# Patient Record
Sex: FEMALE | Race: WHITE
[De-identification: names, ages, dates, MRNs, and addresses within clinical notes are randomized per-mention and may not be internally consistent; named-entity substitution may affect disease eponyms.]

---

## 2020-03-28 ENCOUNTER — HOSPITAL ENCOUNTER (INPATIENT)
Dept: HOSPITAL 56 - MW.ED | Age: 35
LOS: 2 days | Discharge: HOME | DRG: 776 | End: 2020-03-30
Attending: OBSTETRICS & GYNECOLOGY | Admitting: OBSTETRICS & GYNECOLOGY
Payer: MEDICAID

## 2020-03-28 LAB
BUN SERPL-MCNC: 11 MG/DL (ref 7–18)
BUN SERPL-MCNC: 12 MG/DL (ref 7–18)
CHLORIDE SERPL-SCNC: 105 MMOL/L (ref 98–107)
CHLORIDE SERPL-SCNC: 106 MMOL/L (ref 98–107)
CO2 SERPL-SCNC: 26.7 MMOL/L (ref 21–32)
CO2 SERPL-SCNC: 28.3 MMOL/L (ref 21–32)
GLUCOSE SERPL-MCNC: 82 MG/DL (ref 74–106)
GLUCOSE SERPL-MCNC: 90 MG/DL (ref 74–106)
POTASSIUM SERPL-SCNC: 3.6 MMOL/L (ref 3.5–5.1)
POTASSIUM SERPL-SCNC: 3.7 MMOL/L (ref 3.5–5.1)
SODIUM SERPL-SCNC: 143 MMOL/L (ref 136–145)
SODIUM SERPL-SCNC: 144 MMOL/L (ref 136–145)

## 2020-03-28 PROCEDURE — P9016 RBC LEUKOCYTES REDUCED: HCPCS

## 2020-03-28 RX ADMIN — OXYCODONE HYDROCHLORIDE AND ACETAMINOPHEN PRN TAB: 5; 325 TABLET ORAL at 22:37

## 2020-03-28 RX ADMIN — MAGNESIUM SULFATE IN WATER SCH MLS/HR: 40 INJECTION, SOLUTION INTRAVENOUS at 21:50

## 2020-03-28 NOTE — EDM.PDOC
ED HPI GENERAL MEDICAL PROBLEM





- General


Chief Complaint: OB/GYN Problem


Stated Complaint: UNKNOWN


Time Seen by Provider: 03/28/20 18:13


Source of Information: Reports: Patient


History Limitations: Reports: No Limitations





- History of Present Illness


INITIAL COMMENTS - FREE TEXT/NARRATIVE: 





34-year-old female delivered 5 days ago has a history of eclampsia on 

medication.  Patient is on 20 mg of labetalol daily and presented with 

headaches and right upper quadrant abdominal pain.  Denies seizures or any 

other problems.


Onset: Today


Duration: Hour(s):, Getting Worse


Location: Reports: Head, Abdomen


Quality: Reports: Burning, Pressure


Severity: Moderate


Improves with: Reports: None


Worsens with: Reports: None


Associated Symptoms: Reports: Headaches


  ** post sx site


Pain Score (Numeric/FACES): 10





- Related Data


 Allergies











Allergy/AdvReac Type Severity Reaction Status Date / Time


 


ciprofloxacin [From Cipro] Allergy  Hives Verified 03/28/20 17:53


 


Sulfa (Sulfonamide Allergy  Swelling Verified 03/28/20 17:53





Antibiotics)     


 


tizanidine Allergy  Rash Verified 03/28/20 17:53











Home Meds: 


 Home Meds





Acetaminophen/oxyCODONE [Percocet 325-5 MG] 5 - 325 mg PO Q4HR PRN 03/28/20 [

History]


Labetalol [Normodyne] 200 mg PO DAILY 03/28/20 [History]











Past Medical History


HEENT History: Reports: None


Cardiovascular History: Reports: Hypertension


Respiratory History: Reports: Asthma


Genitourinary History: Reports: None


OB/GYN History: Reports: Pregnancy


Musculoskeletal History: Reports: RA


Psychiatric History: Reports: Bipolar, Depression, PTSD, Suicidal Ideation





- Infectious Disease History


Infectious Disease History: Reports: Chicken Pox, Hepatitis A, Hepatitis B, 

Hepatitis C, HIV-Human Immunodeficiency Virus, Measles, Mumps, Shingles





- Past Surgical History


Female  Surgical History: Reports: None





Social & Family History





- Family History


Family Medical History: Noncontributory





ED ROS GENERAL





- Review of Systems


Review Of Systems: See Below


Constitutional: Reports: Weakness, Fatigue


Respiratory: Reports: No Symptoms


Cardiovascular: Reports: No Symptoms


Endocrine: Reports: No Symptoms


GI/Abdominal: Reports: No Symptoms


: Reports: No Symptoms


Neurological: Reports: Headache


Psychiatric: Reports: No Symptoms


Hematologic/Lymphatic: Reports: No Symptoms


Immunologic: Reports: No Symptoms





ED EXAM PREGNANCY





- Physical Exam


Exam: See Below


Exam Limited By: No Limitations


General Appearance: Alert, WD/WN, No Apparent Distress, Moderate Distress


Eye Exam: Bilateral Eye: Normal Fundi, Normal Inspection


Ears: Normal External Exam, Normal Canal


Nose: Normal Inspection, Normal Mucosa


Throat/Mouth: Normal Inspection, Normal Lips


Head: Atraumatic, Normocephalic


Neck: Normal Inspection, Supple


Respiratory/Chest: No Respiratory Distress, Lungs Clear


Cardiovascular: Normal Peripheral Pulses, Regular Rate, Rhythm, No JVD


GI/Abdominal Exam: Normal Bowel Sounds, Soft, Non-Tender


Back Exam: Normal Inspection, Full Range of Motion


Extremities: Normal Inspection, Normal Range of Motion


Neurological: Alert, Oriented, CN II-XII Intact, Normal Cognition, Normal 

Reflexes, No Motor/Sensory Deficits


Psychiatric: Normal Affect, Normal Mood


Skin Exam: Warm, Dry, Normal Color





Course





- Vital Signs


Text/Narrative:: 





This 34-year-old female presents the emergency room chief complaint of 

postpartum eclampsia.  Blood pressure was 180/135 when she presented patient 

given hydralazine IV her blood pressure responded to 130/80.  Patient had a CT 

scan of the head which was negative.  Patient is CBC and electrolytes as well 

as urine.  They appear normal except for anemia hemoglobin is 7.7.  I discussed 

the case with .  Just the patient go to labor and delivery.  Blood 

pressure is now 175 systolic.  Patient is oriented x3.  Patient complaining of 

a mild headache.





AssessmEnt: Postpartum hypertension/eclampsia








The case with the GYN he wishes patient sent to L&D


Last Recorded V/S: 


 Last Vital Signs











Temp  97.3 F   03/28/20 17:36


 


Pulse  103 H  03/28/20 18:33


 


Resp  21 H  03/28/20 18:33


 


BP  174/110 H  03/28/20 18:33


 


Pulse Ox  96   03/28/20 18:33














- Orders/Labs/Meds


Orders: 


 Active Orders 24 hr











 Category Date Time Status


 


 EKG Documentation Completion [RC] STAT Care  03/28/20 17:47 Active


 


 Labetalol [Normodyne] Med  03/28/20 19:34 Once





 20 mg IVPUSH ONETIME ONE   


 


 Sodium Chloride 0.9% [Saline Flush] Med  03/28/20 17:47 Active





 10 ml FLUSH ASDIRECTED PRN   


 


 Sodium Chloride 0.9% [Saline Flush] Med  03/28/20 17:47 Active





 2.5 ml FLUSH ASDIRECTED PRN   


 


 Saline Lock Insert [OM.PC] Stat Oth  03/28/20 17:47 Ordered








 Medication Orders





Labetalol HCl (Normodyne)  20 mg IVPUSH ONETIME ONE; Protocol


   Stop: 03/28/20 19:35


Sodium Chloride (Saline Flush)  10 ml FLUSH ASDIRECTED PRN


   PRN Reason: Keep Vein Open


Sodium Chloride (Saline Flush)  2.5 ml FLUSH ASDIRECTED PRN


   PRN Reason: Keep Vein Open








Labs: 


 Laboratory Tests











  03/28/20 03/28/20 03/28/20 Range/Units





  17:43 17:43 17:43 


 


WBC  10.24    (4.0-11.0)  K/uL


 


RBC  2.66 L    (4.30-5.90)  M/uL


 


Hgb  7.4 L    (12.0-16.0)  g/dL


 


Hct  23.1 L    (36.0-46.0)  %


 


MCV  86.8    (80.0-98.0)  fL


 


MCH  27.8    (27.0-32.0)  pg


 


MCHC  32.0    (31.0-37.0)  g/dL


 


RDW Std Deviation  46.3    (28.0-62.0)  fl


 


RDW Coeff of Pietro  15    (11.0-15.0)  %


 


Plt Count  318    (150-400)  K/uL


 


MPV  9.00    (7.40-12.00)  fL


 


Neut % (Auto)  69.5    (48.0-80.0)  %


 


Lymph % (Auto)  21.2    (16.0-40.0)  %


 


Mono % (Auto)  7.3    (0.0-15.0)  %


 


Eos % (Auto)  1.9    (0.0-7.0)  %


 


Baso % (Auto)  0.1    (0.0-1.5)  %


 


Neut # (Auto)  7.1 H    (1.4-5.7)  K/uL


 


Lymph # (Auto)  2.2    (0.6-2.4)  K/uL


 


Mono # (Auto)  0.8    (0.0-0.8)  K/uL


 


Eos # (Auto)  0.2    (0.0-0.7)  K/uL


 


Baso # (Auto)  0.0    (0.0-0.1)  K/uL


 


Nucleated RBC %  0.0    /100WBC


 


Nucleated RBCs #  0    K/uL


 


INR    0.90  


 


Sodium   143   (136-145)  mmol/L


 


Potassium   3.7   (3.5-5.1)  mmol/L


 


Chloride   105   ()  mmol/L


 


Carbon Dioxide   26.7   (21.0-32.0)  mmol/L


 


BUN   12   (7.0-18.0)  mg/dL


 


Creatinine   0.7   (0.6-1.0)  mg/dL


 


Est Cr Clr Drug Dosing   108.04   mL/min


 


Estimated GFR (MDRD)   > 60.0   ml/min


 


Glucose   90   ()  mg/dL


 


Calcium   8.9   (8.5-10.1)  mg/dL


 


Total Bilirubin   0.6   (0.2-1.0)  mg/dL


 


AST   31   (15-37)  IU/L


 


ALT   21   (14-63)  IU/L


 


Alkaline Phosphatase   85   ()  U/L


 


Total Protein   6.0 L   (6.4-8.2)  g/dL


 


Albumin   2.4 L   (3.4-5.0)  g/dL


 


Globulin   3.6   (2.6-4.0)  g/dL


 


Albumin/Globulin Ratio   0.7 L   (0.9-1.6)  


 


Urine Color     


 


Urine Appearance     


 


Urine pH     (5.0-8.0)  


 


Ur Specific Gravity     (1.001-1.035)  


 


Urine Protein     (NEGATIVE)  mg/dL


 


Urine Glucose (UA)     (NEGATIVE)  mg/dL


 


Urine Ketones     (NEGATIVE)  mg/dL


 


Urine Occult Blood     (NEGATIVE)  


 


Urine Nitrite     (NEGATIVE)  


 


Urine Bilirubin     (NEGATIVE)  


 


Urine Urobilinogen     (<2.0)  EU/dL


 


Ur Leukocyte Esterase     (NEGATIVE)  


 


Urine RBC     (0-2/HPF)  


 


Urine WBC     (0-5/HPF)  


 


Ur Epithelial Cells     (NONE-FEW)  


 


Urine Bacteria     (NEGATIVE)  














  03/28/20 Range/Units





  17:47 


 


WBC   (4.0-11.0)  K/uL


 


RBC   (4.30-5.90)  M/uL


 


Hgb   (12.0-16.0)  g/dL


 


Hct   (36.0-46.0)  %


 


MCV   (80.0-98.0)  fL


 


MCH   (27.0-32.0)  pg


 


MCHC   (31.0-37.0)  g/dL


 


RDW Std Deviation   (28.0-62.0)  fl


 


RDW Coeff of Pietro   (11.0-15.0)  %


 


Plt Count   (150-400)  K/uL


 


MPV   (7.40-12.00)  fL


 


Neut % (Auto)   (48.0-80.0)  %


 


Lymph % (Auto)   (16.0-40.0)  %


 


Mono % (Auto)   (0.0-15.0)  %


 


Eos % (Auto)   (0.0-7.0)  %


 


Baso % (Auto)   (0.0-1.5)  %


 


Neut # (Auto)   (1.4-5.7)  K/uL


 


Lymph # (Auto)   (0.6-2.4)  K/uL


 


Mono # (Auto)   (0.0-0.8)  K/uL


 


Eos # (Auto)   (0.0-0.7)  K/uL


 


Baso # (Auto)   (0.0-0.1)  K/uL


 


Nucleated RBC %   /100WBC


 


Nucleated RBCs #   K/uL


 


INR   


 


Sodium   (136-145)  mmol/L


 


Potassium   (3.5-5.1)  mmol/L


 


Chloride   ()  mmol/L


 


Carbon Dioxide   (21.0-32.0)  mmol/L


 


BUN   (7.0-18.0)  mg/dL


 


Creatinine   (0.6-1.0)  mg/dL


 


Est Cr Clr Drug Dosing   mL/min


 


Estimated GFR (MDRD)   ml/min


 


Glucose   ()  mg/dL


 


Calcium   (8.5-10.1)  mg/dL


 


Total Bilirubin   (0.2-1.0)  mg/dL


 


AST   (15-37)  IU/L


 


ALT   (14-63)  IU/L


 


Alkaline Phosphatase   ()  U/L


 


Total Protein   (6.4-8.2)  g/dL


 


Albumin   (3.4-5.0)  g/dL


 


Globulin   (2.6-4.0)  g/dL


 


Albumin/Globulin Ratio   (0.9-1.6)  


 


Urine Color  YELLOW  


 


Urine Appearance  CLEAR  


 


Urine pH  7.0  (5.0-8.0)  


 


Ur Specific Gravity  1.015  (1.001-1.035)  


 


Urine Protein  NEGATIVE  (NEGATIVE)  mg/dL


 


Urine Glucose (UA)  NEGATIVE  (NEGATIVE)  mg/dL


 


Urine Ketones  NEGATIVE  (NEGATIVE)  mg/dL


 


Urine Occult Blood  SMALL H  (NEGATIVE)  


 


Urine Nitrite  NEGATIVE  (NEGATIVE)  


 


Urine Bilirubin  NEGATIVE  (NEGATIVE)  


 


Urine Urobilinogen  0.2  (<2.0)  EU/dL


 


Ur Leukocyte Esterase  NEGATIVE  (NEGATIVE)  


 


Urine RBC  1-3  (0-2/HPF)  


 


Urine WBC  0-1  (0-5/HPF)  


 


Ur Epithelial Cells  MODERATE  (NONE-FEW)  


 


Urine Bacteria  RARE  (NEGATIVE)  











Meds: 


Medications











Generic Name Dose Route Start Last Admin





  Trade Name Freq  PRN Reason Stop Dose Admin


 


Labetalol HCl  20 mg  03/28/20 19:34  





  Normodyne  IVPUSH  03/28/20 19:35  





  ONETIME ONE   





     





     





  Protocol   





     


 


Sodium Chloride  10 ml  03/28/20 17:47  





  Saline Flush  FLUSH   





  ASDIRECTED PRN   





  Keep Vein Open   





     





     





     


 


Sodium Chloride  2.5 ml  03/28/20 17:47  





  Saline Flush  FLUSH   





  ASDIRECTED PRN   





  Keep Vein Open   





     





     





     














Discontinued Medications














Generic Name Dose Route Start Last Admin





  Trade Name Freq  PRN Reason Stop Dose Admin


 


Hydralazine HCl  20 mg  03/28/20 18:06  03/28/20 18:28





  Apresoline  IVPUSH  03/28/20 18:07  20 mg





  ONETIME ONE   Administration





     





     





     





     














Departure





- Departure


Time of Disposition: 19:39


Disposition: Refer to Observation


Clinical Impression: 


 Postpartum eclampsia








- Discharge Information


Referrals: 


Lydia Weathers CNM [Primary Care Provider] - 


Forms:  ED Department Discharge





Sepsis Event Note





- Evaluation


Sepsis Screening Result: No Definite Risk





- Focused Exam


Vital Signs: 


 Vital Signs











  Temp Pulse Resp BP Pulse Ox


 


 03/28/20 18:33   103 H  21 H  174/110 H  96


 


 03/28/20 18:26      90 L


 


 03/28/20 18:15   99  22 H  186/115 H  90 L


 


 03/28/20 18:00   116 H  19  144/97 H  98


 


 03/28/20 17:36  97.3 F  104 H  21 H  183/98 H  95











Date Exam was Performed: 03/28/20


Time Exam was Performed: 19:35





- My Orders


Last 24 Hours: 


My Active Orders





03/28/20 19:34


Labetalol [Normodyne]   20 mg IVPUSH ONETIME ONE 














- Assessment/Plan


Last 24 Hours: 


My Active Orders





03/28/20 19:34


Labetalol [Normodyne]   20 mg IVPUSH ONETIME ONE

## 2020-03-28 NOTE — CR
INDICATION:



Postpartum. Rule out PNA.



TECHNIQUE:



Semi upright portable AP image of the chest.



COMPARISON:



None.



FINDINGS:



Lungs somewhat low in volume. No obvious infiltrate. No pleural fusion. 

Heart size and pulmonary vasculature within normal limits. Mild thoracic 

dextroscoliosis.



IMPRESSION:



Lungs somewhat low in volume. No obvious infiltrate.



Dictated by Juan Miguel Villasenor MD @ Mar 28 2020  9:54PM



Signed by Dr. Juan Miguel Villasenor @ Mar 28 2020  9:56PM

## 2020-03-28 NOTE — CT
Head CT

 

Technique: Multiple axial sections through the brain were obtained.  

Intravenous contrast was not utilized.

 

Comparison: No prior intracranial imaging is available.

 

Findings: Ventricles along with basal cisterns and sulci over the 

convexities are within normal limits for the patient's age.  No 

abnormal parenchymal densities are seen.  No evidence of intracranial 

hemorrhage.  No midline shift or mass effect is seen.

 

Minimal mucosal thickening is noted within the ethmoid sinuses 

believed to be incidental.  Visualized mastoid sinuses show nothing 

acute.  No acute calvarial abnormality is seen.

 

Impression:

1.  Minimal sinus findings, believed to be incidental.

2.  No acute intracranial abnormality is appreciated.

 

Diagnostic code #2

 

Study was dictated in MDT

## 2020-03-29 RX ADMIN — OXYCODONE HYDROCHLORIDE AND ACETAMINOPHEN PRN TAB: 5; 325 TABLET ORAL at 18:37

## 2020-03-29 RX ADMIN — OXYCODONE HYDROCHLORIDE AND ACETAMINOPHEN PRN TAB: 5; 325 TABLET ORAL at 10:38

## 2020-03-29 RX ADMIN — MAGNESIUM SULFATE IN WATER SCH MLS/HR: 40 INJECTION, SOLUTION INTRAVENOUS at 08:04

## 2020-03-29 RX ADMIN — OXYCODONE HYDROCHLORIDE AND ACETAMINOPHEN PRN TAB: 5; 325 TABLET ORAL at 14:31

## 2020-03-29 RX ADMIN — NIFEDIPINE SCH MG: 30 TABLET, FILM COATED, EXTENDED RELEASE ORAL at 11:40

## 2020-03-29 RX ADMIN — OXYCODONE HYDROCHLORIDE AND ACETAMINOPHEN PRN TAB: 5; 325 TABLET ORAL at 22:46

## 2020-03-29 RX ADMIN — NIFEDIPINE SCH MG: 30 TABLET, FILM COATED, EXTENDED RELEASE ORAL at 21:19

## 2020-03-29 RX ADMIN — OXYCODONE HYDROCHLORIDE AND ACETAMINOPHEN PRN TAB: 5; 325 TABLET ORAL at 02:34

## 2020-03-29 RX ADMIN — OXYCODONE HYDROCHLORIDE AND ACETAMINOPHEN PRN TAB: 5; 325 TABLET ORAL at 06:31

## 2020-03-29 NOTE — PCM.HP.2
H&P History of Present Illness





- General


Date of Service: 03/28/20


Admit Problem/Dx: 


 Admission Diagnosis/Problem





Admission Diagnosis/Problem      Eclampsia, postpartum condition or complication








Source of Information: Patient


History Limitations: Reports: No Limitations





- History of Present Illness


Improves with: Reports: None


Worsens with: Reports: None


Associated Symptoms: Reports: No Other Symptoms


  ** post sx site


Pain Score (Numeric/FACES): 7





- Related Data


Allergies/Adverse Reactions: 


 Allergies











Allergy/AdvReac Type Severity Reaction Status Date / Time


 


ciprofloxacin [From Cipro] Allergy  Hives Verified 03/28/20 17:53


 


Sulfa (Sulfonamide Allergy  Swelling Verified 03/28/20 17:53





Antibiotics)     


 


tizanidine Allergy  Rash Verified 03/28/20 17:53











Home Medications: 


 Home Meds





Acetaminophen/oxyCODONE [Percocet 325-5 MG] 5 - 325 mg PO Q4HR PRN 03/28/20 [

History]


Labetalol [Normodyne] 200 mg PO DAILY 03/28/20 [History]











Past Medical History


HEENT History: Reports: None


Cardiovascular History: Reports: Hypertension


Respiratory History: Reports: Asthma


Genitourinary History: Reports: None


OB/GYN History: Reports: Pregnancy


Other OB/BYN History: pre-ecclampsia


Musculoskeletal History: Reports: RA


Other Musculoskeletal History: degenerative disc, herniated disc, sciatic issues


Psychiatric History: Reports: Bipolar, Depression, PTSD, Suicidal Ideation





- Infectious Disease History


Infectious Disease History: Reports: Chicken Pox, Hepatitis A, Hepatitis B, 

Hepatitis C, HIV-Human Immunodeficiency Virus, Measles, Mumps, Shingles





- Past Surgical History


Female  Surgical History: Reports: None





Social & Family History





- Family History


Family Medical History: Noncontributory





- Tobacco Use


Smoking Status *Q: Never Smoker


Second Hand Smoke Exposure: No





- Caffeine Use


Caffeine Use: Reports: None





- Recreational Drug Use


Recreational Drug Use: No





H&P Review of Systems





- Review of Systems:


Review Of Systems: See Below


General: Reports: No Symptoms


HEENT: Reports: No Symptoms


Pulmonary: Reports: No Symptoms


Cardiovascular: Reports: No Symptoms


Gastrointestinal: Reports: No Symptoms


Genitourinary: Reports: No Symptoms


Musculoskeletal: Reports: No Symptoms


Skin: Reports: No Symptoms


Psychiatric: Reports: No Symptoms


Neurological: Reports: No Symptoms


Hematologic/Lymphatic: Reports: No Symptoms


Immunologic: Reports: No Symptoms





Exam





- Exam


Exam: See Below





- Vital Signs


Vital Signs: 


 Last Vital Signs











Temp  36.9 C   03/29/20 09:00


 


Pulse  110 H  03/29/20 11:00


 


Resp  20   03/29/20 11:00


 


BP  169/106 H  03/29/20 11:00


 


Pulse Ox  90 L  03/29/20 11:00











Weight: 98.43 kg





- Exam


General: Alert, Oriented, 4


HEENT: PERRLA, Hearing Intact, Mucosa Moist & Pink, Nares Patent, Normal Nasal 

Septum, Posterior Pharynx Clear, Conjunctiva Clear, EOMI, EACs Clear, TMs Clear


Neck: Supple, Trachea Midline, 2


Lungs: Clear to Auscultation, Normal Respiratory Effort


Cardiovascular: Regular Rate, Regular Rhythm


GI/Abdominal Exam: Normal Bowel Sounds, Soft, Non-Tender, No Organomegaly, No 

Distention, No Abnormal Bruit, No Mass, Pelvis Stable


 (Female) Exam: Normal External Exam, Normal Speculum Exam, Normal Bimanual 

Exam


Rectal (Female) Exam: Normal Exam, Normal Rectal Tone


Back Exam: Normal Inspection, Full Range of Motion, NT


Extremities: Normal Inspection, Normal Range of Motion, Non-Tender, No Pedal 

Edema, Normal Capillary Refill


Skin: Warm, Dry, Intact


Neurological: Cranial Nerves Intact, Reflexes Equal Bilateral


Neuro Extensive - Mental Status: Alert, Oriented x3, Normal Mood/Affect, Normal 

Cognition


Neuro Extensive - Motor, Sensory, Reflexes: CN II-XII Intact, Normal Gait, 

Normal Reflexes


Psychiatric: Alert, Normal Affect, Normal Mood





- Patient Data


Lab Results Last 24 hrs: 


 Laboratory Results - last 24 hr











  03/28/20 03/28/20 03/28/20 Range/Units





  17:43 17:43 17:43 


 


WBC  10.24    (4.0-11.0)  K/uL


 


RBC  2.66 L    (4.30-5.90)  M/uL


 


Hgb  7.4 L    (12.0-16.0)  g/dL


 


Hct  23.1 L    (36.0-46.0)  %


 


MCV  86.8    (80.0-98.0)  fL


 


MCH  27.8    (27.0-32.0)  pg


 


MCHC  32.0    (31.0-37.0)  g/dL


 


RDW Std Deviation  46.3    (28.0-62.0)  fl


 


RDW Coeff of Pietro  15    (11.0-15.0)  %


 


Plt Count  318    (150-400)  K/uL


 


MPV  9.00    (7.40-12.00)  fL


 


Neut % (Auto)  69.5    (48.0-80.0)  %


 


Lymph % (Auto)  21.2    (16.0-40.0)  %


 


Mono % (Auto)  7.3    (0.0-15.0)  %


 


Eos % (Auto)  1.9    (0.0-7.0)  %


 


Baso % (Auto)  0.1    (0.0-1.5)  %


 


Neut # (Auto)  7.1 H    (1.4-5.7)  K/uL


 


Lymph # (Auto)  2.2    (0.6-2.4)  K/uL


 


Mono # (Auto)  0.8    (0.0-0.8)  K/uL


 


Eos # (Auto)  0.2    (0.0-0.7)  K/uL


 


Baso # (Auto)  0.0    (0.0-0.1)  K/uL


 


Nucleated RBC %  0.0    /100WBC


 


Nucleated RBCs #  0    K/uL


 


INR    0.90  


 


Sodium   143   (136-145)  mmol/L


 


Potassium   3.7   (3.5-5.1)  mmol/L


 


Chloride   105   ()  mmol/L


 


Carbon Dioxide   26.7   (21.0-32.0)  mmol/L


 


BUN   12   (7.0-18.0)  mg/dL


 


Creatinine   0.7   (0.6-1.0)  mg/dL


 


Est Cr Clr Drug Dosing   108.04   mL/min


 


Estimated GFR (MDRD)   > 60.0   ml/min


 


Glucose   90   ()  mg/dL


 


Uric Acid     (2.6-7.2)  mg/dL


 


Calcium   8.9   (8.5-10.1)  mg/dL


 


Magnesium     (1.8-2.4)  mg/dL


 


Total Bilirubin   0.6   (0.2-1.0)  mg/dL


 


AST   31   (15-37)  IU/L


 


ALT   21   (14-63)  IU/L


 


Alkaline Phosphatase   85   ()  U/L


 


Total Protein   6.0 L   (6.4-8.2)  g/dL


 


Albumin   2.4 L   (3.4-5.0)  g/dL


 


Globulin   3.6   (2.6-4.0)  g/dL


 


Albumin/Globulin Ratio   0.7 L   (0.9-1.6)  


 


Urine Color     


 


Urine Appearance     


 


Urine pH     (5.0-8.0)  


 


Ur Specific Gravity     (1.001-1.035)  


 


Urine Protein     (NEGATIVE)  mg/dL


 


Urine Glucose (UA)     (NEGATIVE)  mg/dL


 


Urine Ketones     (NEGATIVE)  mg/dL


 


Urine Occult Blood     (NEGATIVE)  


 


Urine Nitrite     (NEGATIVE)  


 


Urine Bilirubin     (NEGATIVE)  


 


Urine Urobilinogen     (<2.0)  EU/dL


 


Ur Leukocyte Esterase     (NEGATIVE)  


 


Urine RBC     (0-2/HPF)  


 


Urine WBC     (0-5/HPF)  


 


Ur Epithelial Cells     (NONE-FEW)  


 


Urine Bacteria     (NEGATIVE)  


 


Blood Type     


 


Antibody Screen     


 


Crossmatch     














  03/28/20 03/28/20 03/28/20 Range/Units





  17:47 21:47 21:47 


 


WBC   10.66   (4.0-11.0)  K/uL


 


RBC   2.74 L   (4.30-5.90)  M/uL


 


Hgb   7.7 L   (12.0-16.0)  g/dL


 


Hct   23.8 L   (36.0-46.0)  %


 


MCV   86.9   (80.0-98.0)  fL


 


MCH   28.1   (27.0-32.0)  pg


 


MCHC   32.4   (31.0-37.0)  g/dL


 


RDW Std Deviation   46.7   (28.0-62.0)  fl


 


RDW Coeff of Pietro   15   (11.0-15.0)  %


 


Plt Count   342   (150-400)  K/uL


 


MPV   9.20   (7.40-12.00)  fL


 


Neut % (Auto)     (48.0-80.0)  %


 


Lymph % (Auto)     (16.0-40.0)  %


 


Mono % (Auto)     (0.0-15.0)  %


 


Eos % (Auto)     (0.0-7.0)  %


 


Baso % (Auto)     (0.0-1.5)  %


 


Neut # (Auto)     (1.4-5.7)  K/uL


 


Lymph # (Auto)     (0.6-2.4)  K/uL


 


Mono # (Auto)     (0.0-0.8)  K/uL


 


Eos # (Auto)     (0.0-0.7)  K/uL


 


Baso # (Auto)     (0.0-0.1)  K/uL


 


Nucleated RBC %   0.7   /100WBC


 


Nucleated RBCs #   0   K/uL


 


INR     


 


Sodium    144  (136-145)  mmol/L


 


Potassium    3.6  (3.5-5.1)  mmol/L


 


Chloride    106  ()  mmol/L


 


Carbon Dioxide    28.3  (21.0-32.0)  mmol/L


 


BUN    11  (7.0-18.0)  mg/dL


 


Creatinine    0.6  (0.6-1.0)  mg/dL


 


Est Cr Clr Drug Dosing    126.05  mL/min


 


Estimated GFR (MDRD)    > 60.0  ml/min


 


Glucose    82  ()  mg/dL


 


Uric Acid    5.6  (2.6-7.2)  mg/dL


 


Calcium    9.1  (8.5-10.1)  mg/dL


 


Magnesium     (1.8-2.4)  mg/dL


 


Total Bilirubin    0.6  (0.2-1.0)  mg/dL


 


AST    28  (15-37)  IU/L


 


ALT    24  (14-63)  IU/L


 


Alkaline Phosphatase    82  ()  U/L


 


Total Protein    6.1 L  (6.4-8.2)  g/dL


 


Albumin    2.5 L  (3.4-5.0)  g/dL


 


Globulin    3.6  (2.6-4.0)  g/dL


 


Albumin/Globulin Ratio    0.7 L  (0.9-1.6)  


 


Urine Color  YELLOW    


 


Urine Appearance  CLEAR    


 


Urine pH  7.0    (5.0-8.0)  


 


Ur Specific Gravity  1.015    (1.001-1.035)  


 


Urine Protein  NEGATIVE    (NEGATIVE)  mg/dL


 


Urine Glucose (UA)  NEGATIVE    (NEGATIVE)  mg/dL


 


Urine Ketones  NEGATIVE    (NEGATIVE)  mg/dL


 


Urine Occult Blood  SMALL H    (NEGATIVE)  


 


Urine Nitrite  NEGATIVE    (NEGATIVE)  


 


Urine Bilirubin  NEGATIVE    (NEGATIVE)  


 


Urine Urobilinogen  0.2    (<2.0)  EU/dL


 


Ur Leukocyte Esterase  NEGATIVE    (NEGATIVE)  


 


Urine RBC  1-3    (0-2/HPF)  


 


Urine WBC  0-1    (0-5/HPF)  


 


Ur Epithelial Cells  MODERATE    (NONE-FEW)  


 


Urine Bacteria  RARE    (NEGATIVE)  


 


Blood Type     


 


Antibody Screen     


 


Crossmatch     














  03/28/20 03/29/20 03/29/20 Range/Units





  21:47 01:35 06:20 


 


WBC     (4.0-11.0)  K/uL


 


RBC     (4.30-5.90)  M/uL


 


Hgb    9.7 L  (12.0-16.0)  g/dL


 


Hct    30.3 L  (36.0-46.0)  %


 


MCV     (80.0-98.0)  fL


 


MCH     (27.0-32.0)  pg


 


MCHC     (31.0-37.0)  g/dL


 


RDW Std Deviation     (28.0-62.0)  fl


 


RDW Coeff of Pietro     (11.0-15.0)  %


 


Plt Count     (150-400)  K/uL


 


MPV     (7.40-12.00)  fL


 


Neut % (Auto)     (48.0-80.0)  %


 


Lymph % (Auto)     (16.0-40.0)  %


 


Mono % (Auto)     (0.0-15.0)  %


 


Eos % (Auto)     (0.0-7.0)  %


 


Baso % (Auto)     (0.0-1.5)  %


 


Neut # (Auto)     (1.4-5.7)  K/uL


 


Lymph # (Auto)     (0.6-2.4)  K/uL


 


Mono # (Auto)     (0.0-0.8)  K/uL


 


Eos # (Auto)     (0.0-0.7)  K/uL


 


Baso # (Auto)     (0.0-0.1)  K/uL


 


Nucleated RBC %     /100WBC


 


Nucleated RBCs #     K/uL


 


INR     


 


Sodium     (136-145)  mmol/L


 


Potassium     (3.5-5.1)  mmol/L


 


Chloride     ()  mmol/L


 


Carbon Dioxide     (21.0-32.0)  mmol/L


 


BUN     (7.0-18.0)  mg/dL


 


Creatinine     (0.6-1.0)  mg/dL


 


Est Cr Clr Drug Dosing     mL/min


 


Estimated GFR (MDRD)     ml/min


 


Glucose     ()  mg/dL


 


Uric Acid     (2.6-7.2)  mg/dL


 


Calcium     (8.5-10.1)  mg/dL


 


Magnesium   3.6 H   (1.8-2.4)  mg/dL


 


Total Bilirubin     (0.2-1.0)  mg/dL


 


AST     (15-37)  IU/L


 


ALT     (14-63)  IU/L


 


Alkaline Phosphatase     ()  U/L


 


Total Protein     (6.4-8.2)  g/dL


 


Albumin     (3.4-5.0)  g/dL


 


Globulin     (2.6-4.0)  g/dL


 


Albumin/Globulin Ratio     (0.9-1.6)  


 


Urine Color     


 


Urine Appearance     


 


Urine pH     (5.0-8.0)  


 


Ur Specific Gravity     (1.001-1.035)  


 


Urine Protein     (NEGATIVE)  mg/dL


 


Urine Glucose (UA)     (NEGATIVE)  mg/dL


 


Urine Ketones     (NEGATIVE)  mg/dL


 


Urine Occult Blood     (NEGATIVE)  


 


Urine Nitrite     (NEGATIVE)  


 


Urine Bilirubin     (NEGATIVE)  


 


Urine Urobilinogen     (<2.0)  EU/dL


 


Ur Leukocyte Esterase     (NEGATIVE)  


 


Urine RBC     (0-2/HPF)  


 


Urine WBC     (0-5/HPF)  


 


Ur Epithelial Cells     (NONE-FEW)  


 


Urine Bacteria     (NEGATIVE)  


 


Blood Type  O POSITIVE    


 


Antibody Screen  NEGATIVE    


 


Crossmatch  See Detail    














  03/29/20 Range/Units





  06:20 


 


WBC   (4.0-11.0)  K/uL


 


RBC   (4.30-5.90)  M/uL


 


Hgb   (12.0-16.0)  g/dL


 


Hct   (36.0-46.0)  %


 


MCV   (80.0-98.0)  fL


 


MCH   (27.0-32.0)  pg


 


MCHC   (31.0-37.0)  g/dL


 


RDW Std Deviation   (28.0-62.0)  fl


 


RDW Coeff of Pietro   (11.0-15.0)  %


 


Plt Count   (150-400)  K/uL


 


MPV   (7.40-12.00)  fL


 


Neut % (Auto)   (48.0-80.0)  %


 


Lymph % (Auto)   (16.0-40.0)  %


 


Mono % (Auto)   (0.0-15.0)  %


 


Eos % (Auto)   (0.0-7.0)  %


 


Baso % (Auto)   (0.0-1.5)  %


 


Neut # (Auto)   (1.4-5.7)  K/uL


 


Lymph # (Auto)   (0.6-2.4)  K/uL


 


Mono # (Auto)   (0.0-0.8)  K/uL


 


Eos # (Auto)   (0.0-0.7)  K/uL


 


Baso # (Auto)   (0.0-0.1)  K/uL


 


Nucleated RBC %   /100WBC


 


Nucleated RBCs #   K/uL


 


INR   


 


Sodium   (136-145)  mmol/L


 


Potassium   (3.5-5.1)  mmol/L


 


Chloride   ()  mmol/L


 


Carbon Dioxide   (21.0-32.0)  mmol/L


 


BUN   (7.0-18.0)  mg/dL


 


Creatinine   (0.6-1.0)  mg/dL


 


Est Cr Clr Drug Dosing   mL/min


 


Estimated GFR (MDRD)   ml/min


 


Glucose   ()  mg/dL


 


Uric Acid   (2.6-7.2)  mg/dL


 


Calcium   (8.5-10.1)  mg/dL


 


Magnesium  4.8 H  (1.8-2.4)  mg/dL


 


Total Bilirubin   (0.2-1.0)  mg/dL


 


AST   (15-37)  IU/L


 


ALT   (14-63)  IU/L


 


Alkaline Phosphatase   ()  U/L


 


Total Protein   (6.4-8.2)  g/dL


 


Albumin   (3.4-5.0)  g/dL


 


Globulin   (2.6-4.0)  g/dL


 


Albumin/Globulin Ratio   (0.9-1.6)  


 


Urine Color   


 


Urine Appearance   


 


Urine pH   (5.0-8.0)  


 


Ur Specific Gravity   (1.001-1.035)  


 


Urine Protein   (NEGATIVE)  mg/dL


 


Urine Glucose (UA)   (NEGATIVE)  mg/dL


 


Urine Ketones   (NEGATIVE)  mg/dL


 


Urine Occult Blood   (NEGATIVE)  


 


Urine Nitrite   (NEGATIVE)  


 


Urine Bilirubin   (NEGATIVE)  


 


Urine Urobilinogen   (<2.0)  EU/dL


 


Ur Leukocyte Esterase   (NEGATIVE)  


 


Urine RBC   (0-2/HPF)  


 


Urine WBC   (0-5/HPF)  


 


Ur Epithelial Cells   (NONE-FEW)  


 


Urine Bacteria   (NEGATIVE)  


 


Blood Type   


 


Antibody Screen   


 


Crossmatch   











Result Diagrams: 


 03/29/20 06:20





 03/28/20 21:47





Sepsis Event Note





- Evaluation


Sepsis Screening Result: No Definite Risk





- Focused Exam


Vital Signs: 


 Vital Signs











  Temp Temp Pulse Resp BP Pulse Ox


 


 03/29/20 11:00    110 H  20  169/106 H  90 L


 


 03/29/20 10:00    106 H  22 H  168/101 H  93 L


 


 03/29/20 09:00   36.9 C  98  19  178/79 H  93 L


 


 03/29/20 08:00    104 H  27 H  171/94 H  92 L


 


 03/29/20 07:30   36.9 C  105 H  24 H  162/99 H  95


 


 03/29/20 04:30     20   88 L


 


 03/29/20 03:45  36.8 C   103 H  19  165/101 H  95


 


 03/29/20 03:30  36.9 C   107 H  19  160/93 H 


 


 03/29/20 03:15  36.9 C   92  18  160/93 H  96


 


 03/29/20 02:05  37.1 C   103 H  19  169/92 H  95


 


 03/29/20 01:50  36.8 C   97  21 H   95


 


 03/29/20 01:35  36.9 C   102 H  22 H  166/89 H  97











Date Exam was Performed: 03/29/20


Time Exam was Performed: 11:09


Problem List Initiated/Reviewed/Updated: Yes


Orders Last 24hrs: 


 Active Orders 24 hr











 Category Date Time Status


 


 Patient Status [ADT] Routine ADT  03/28/20 21:05 Active


 


 Antiembolic Devices [RC] .Routine Care  03/28/20 21:06 Active


 


 Bedrest [RC] ASDIRECTED Care  03/28/20 21:05 Active


 


 Communication Order [RC] PRN Care  03/28/20 21:05 Active


 


 Communication Order [RC] PRN Care  03/28/20 21:05 Active


 


 EKG Documentation Completion [RC] STAT Care  03/28/20 17:47 Active


 


 Equipment to Bedside [RC] PRN Care  03/28/20 21:10 Active


 


 Height and Weight [RC] DAILY Care  03/28/20 21:05 Active


 


 Intake and Output [RC] QSHIFT Care  03/28/20 21:05 Active


 


 Notify Provider Status Change [RC] ASDIRECTED Care  03/28/20 21:10 Active


 


 Notify Provider [RC] PRN Care  03/28/20 21:05 Active


 


 Oxygen Therapy [RC] PRN Care  03/28/20 21:05 Active


 


 VTE/DVT Education [RC] PER UNIT ROUTINE Care  03/28/20 21:06 Active


 


 Vital Signs [RC] ASDIRECTED Care  03/28/20 21:05 Active


 


 MAGNESIUM [CHEM] Q6H Lab  03/29/20 13:30 Ordered


 


 MAGNESIUM [CHEM] Q6H Lab  03/29/20 19:30 Ordered


 


 MAGNESIUM [CHEM] Q6H Lab  03/30/20 01:30 Ordered


 


 Acetaminophen/oxyCODONE [Percocet 325-5 MG] Med  03/28/20 21:45 Active





 1 tab PO Q4H PRN   


 


 Acetaminophen/oxyCODONE [Percocet 325-5 MG] Med  03/28/20 21:45 Active





 2 tab PO Q4H PRN   


 


 Calcium Gluconate Med  03/28/20 21:05 Active





 1 gm IV ASDIRECTED PRN   


 


 Lactated Ringers [Ringers, Lactated] 1,000 ml Med  03/28/20 22:15 Active





 IV ASDIRECTED   


 


 Magnesium Sulfate/Water [Magnesium Sulfate in Water Med  03/28/20 21:15 Active





 Premix]   





 20 gm in 500 ml IV ASDIRECTED   


 


 NIFEdipine [Procardia XL] Med  03/29/20 11:15 Ordered





 30 mg PO BID   


 


 Sodium Chloride 0.9% [Normal Saline] Med  03/28/20 21:05 Active





 10 ml IV ASDIRECTED PRN   


 


 Sodium Chloride 0.9% [Saline Flush] Med  03/28/20 17:47 Active





 10 ml FLUSH ASDIRECTED PRN   


 


 Sodium Chloride 0.9% [Saline Flush] Med  03/28/20 21:05 Active





 10 ml FLUSH ASDIRECTED PRN   


 


 Sodium Chloride 0.9% [Saline Flush] Med  03/28/20 17:47 Active





 2.5 ml FLUSH ASDIRECTED PRN   


 


 Sodium Chloride 0.9% [Saline Flush] Med  03/28/20 21:05 Active





 2.5 ml FLUSH ASDIRECTED PRN   


 


 DVT/VTE Prophylaxis Reflex [OM.PC] Routine Ot  03/28/20 21:05 Ordered


 


 Deep Tendon Reflexes [[facility]] [facility]  03/28/20 21:15 Ordered


 


 Deep Tendon Reflexes [WOMSER] Q1[facility]  03/28/20 22:15 Ordered


 


 Deep Tendon Reflexes [WOMSER] Q1 Ot  03/28/20 23:15 Ordered


 


 Deep Tendon Reflexes [WOMSER] Q1 Ot  03/29/20 00:15 Ordered


 


 Deep Tendon Reflexes [WOMSER] Q1 Ot  03/29/20 01:15 Ordered


 


 Deep Tendon Reflexes [WOMSER] Q1[facility]  03/29/20 02:15 Ordered


 


 Deep Tendon Reflexes [WOMSER] Q1[facility]  03/29/20 03:15 Ordered


 


 Deep Tendon Reflexes [WOMSER] Q1 Ot  03/29/20 04:15 Ordered


 


 Deep Tendon Reflexes [WOMSER] Q1H Oth  03/29/20 05:15 Ordered


 


 Deep Tendon Reflexes [WOMSER] [address]  03/29/20 06:15 Ordered


 


 Deep Tendon Reflexes [WOMSER] [address]  03/29/20 07:15 Ordered


 


 Deep Tendon Reflexes [WOMSER] [address]  03/29/20 08:15 Ordered


 


 Deep Tendon Reflexes [WOMSER] [address]  03/29/20 09:15 Ordered


 


 Deep Tendon Reflexes [WOMSER] [address]  03/29/20 10:15 Ordered


 


 Deep Tendon Reflexes [WOMSER] [address]  03/29/20 11:15 Ordered


 


 Deep Tendon Reflexes [WOMSER] [address]  03/29/20 12:15 Ordered


 


 Deep Tendon Reflexes [WOMSER] [address]  03/29/20 13:15 Ordered


 


 Deep Tendon Reflexes [WOMSER] [address]  03/29/20 14:15 Ordered


 


 Deep Tendon Reflexes [WOMSER] [address]  03/29/20 15:15 Ordered


 


 Deep Tendon Reflexes [WOMSER] [address]  03/29/20 16:15 Ordered


 


 Deep Tendon Reflexes [WOMSER] [address]  03/29/20 17:15 Ordered


 


 Deep Tendon Reflexes [WOMSER] [address]  03/29/20 18:15 Ordered


 


 Deep Tendon Reflexes [WOMSER] [address]  03/29/20 19:15 Ordered


 


 Deep Tendon Reflexes [WOMSER] [address]  03/29/20 20:15 Ordered


 


 Electronic Fetal Heart Tones Ext w TOCO [WOMSER] Per Ot  03/28/20 21:05 

Ordered





 Unit Routine   


 


 Peripheral IV Insertion Adult [OM.PC] Routine Ot  03/28/20 21:05 Ordered


 


 Saline Lock Insert [OM.PC] Stat Ot  03/28/20 17:47 Ordered


 


 Transfuse PRBC [Transfuse Red Blood Cells] [COMM] Stat Ot  03/28/20 21:53 

Ordered








 Medication Orders





Calcium Gluconate (Calcium Gluconate)  1 gm IV ASDIRECTED PRN


   PRN Reason: respiratory distress


Magnesium Sulfate (Magnesium Sulfate In Water Premix)  20 gm in 500 mls @ 50 mls

/hr IV ASDIRECTED LEVY; Protocol


   Last Admin: 03/29/20 08:04  Dose: 2 gm/hr, 50 mls/hr


   Admin: 03/28/20 21:50  Dose: 2 gm/hr, 50 mls/hr


Lactated Ringer's (Ringers, Lactated)  1,000 mls @ 5 mls/hr IV ASDIRECTED LEVY


   Last Admin: 03/28/20 21:30  Dose: 5 mls/hr


Nifedipine (Procardia Xl)  30 mg PO BID LEVY


Oxycodone/Acetaminophen (Percocet 325-5 Mg)  2 tab PO Q4H PRN


   PRN Reason: Pain (severe 7-10)


   Last Admin: 03/29/20 10:38  Dose: 2 tab


   Admin: 03/29/20 06:31  Dose: 2 tab


   Admin: 03/29/20 02:34  Dose: 2 tab


   Admin: 03/28/20 22:37  Dose: 2 tab


Oxycodone/Acetaminophen (Percocet 325-5 Mg)  1 tab PO Q4H PRN


   PRN Reason: Pain (moderate 4-6)


Sodium Chloride (Saline Flush)  10 ml FLUSH ASDIRECTED PRN


   PRN Reason: Keep Vein Open


Sodium Chloride (Saline Flush)  2.5 ml FLUSH ASDIRECTED PRN


   PRN Reason: Keep Vein Open


Sodium Chloride (Saline Flush)  10 ml FLUSH ASDIRECTED PRN


   PRN Reason: Keep Vein Open


Sodium Chloride (Saline Flush)  2.5 ml FLUSH ASDIRECTED PRN


   PRN Reason: Keep Vein Open


Sodium Chloride (Normal Saline)  10 ml IV ASDIRECTED PRN


   PRN Reason: IV Use








Assessment/Plan Comment:: 





S/P c/section 4 day ago for sever preeclampsia. admitted with elevated blood 

pr.

## 2020-03-29 NOTE — PCM.PN
- General Info


Date of Service: 03/29/20


Functional Status: Reports: Pain Controlled





- Review of Systems


General: Reports: No Symptoms


HEENT: Reports: No Symptoms


Pulmonary: Reports: No Symptoms


Cardiovascular: Reports: No Symptoms


Gastrointestinal: Reports: No Symptoms


Genitourinary: Reports: No Symptoms


Musculoskeletal: Reports: No Symptoms


Skin: Reports: No Symptoms


Neurological: Reports: No Symptoms


Psychiatric: Reports: No Symptoms





- Patient Data


Vitals - Most Recent: 


 Last Vital Signs











Temp  36.9 C   03/29/20 09:00


 


Pulse  110 H  03/29/20 11:00


 


Resp  20   03/29/20 11:00


 


BP  169/106 H  03/29/20 11:00


 


Pulse Ox  90 L  03/29/20 11:00











Weight - Most Recent: 98.43 kg


I&O - Last 24 Hours: 


 Intake & Output











 03/28/20 03/29/20 03/29/20





 22:59 06:59 14:59


 


Intake Total  800 


 


Output Total 728 3830 


 


Balance -725 -1870 











Lab Results Last 24 Hours: 


 Laboratory Results - last 24 hr











  03/28/20 03/28/20 03/28/20 Range/Units





  17:43 17:43 17:43 


 


WBC  10.24    (4.0-11.0)  K/uL


 


RBC  2.66 L    (4.30-5.90)  M/uL


 


Hgb  7.4 L    (12.0-16.0)  g/dL


 


Hct  23.1 L    (36.0-46.0)  %


 


MCV  86.8    (80.0-98.0)  fL


 


MCH  27.8    (27.0-32.0)  pg


 


MCHC  32.0    (31.0-37.0)  g/dL


 


RDW Std Deviation  46.3    (28.0-62.0)  fl


 


RDW Coeff of Pietro  15    (11.0-15.0)  %


 


Plt Count  318    (150-400)  K/uL


 


MPV  9.00    (7.40-12.00)  fL


 


Neut % (Auto)  69.5    (48.0-80.0)  %


 


Lymph % (Auto)  21.2    (16.0-40.0)  %


 


Mono % (Auto)  7.3    (0.0-15.0)  %


 


Eos % (Auto)  1.9    (0.0-7.0)  %


 


Baso % (Auto)  0.1    (0.0-1.5)  %


 


Neut # (Auto)  7.1 H    (1.4-5.7)  K/uL


 


Lymph # (Auto)  2.2    (0.6-2.4)  K/uL


 


Mono # (Auto)  0.8    (0.0-0.8)  K/uL


 


Eos # (Auto)  0.2    (0.0-0.7)  K/uL


 


Baso # (Auto)  0.0    (0.0-0.1)  K/uL


 


Nucleated RBC %  0.0    /100WBC


 


Nucleated RBCs #  0    K/uL


 


INR    0.90  


 


Sodium   143   (136-145)  mmol/L


 


Potassium   3.7   (3.5-5.1)  mmol/L


 


Chloride   105   ()  mmol/L


 


Carbon Dioxide   26.7   (21.0-32.0)  mmol/L


 


BUN   12   (7.0-18.0)  mg/dL


 


Creatinine   0.7   (0.6-1.0)  mg/dL


 


Est Cr Clr Drug Dosing   108.04   mL/min


 


Estimated GFR (MDRD)   > 60.0   ml/min


 


Glucose   90   ()  mg/dL


 


Uric Acid     (2.6-7.2)  mg/dL


 


Calcium   8.9   (8.5-10.1)  mg/dL


 


Magnesium     (1.8-2.4)  mg/dL


 


Total Bilirubin   0.6   (0.2-1.0)  mg/dL


 


AST   31   (15-37)  IU/L


 


ALT   21   (14-63)  IU/L


 


Alkaline Phosphatase   85   ()  U/L


 


Total Protein   6.0 L   (6.4-8.2)  g/dL


 


Albumin   2.4 L   (3.4-5.0)  g/dL


 


Globulin   3.6   (2.6-4.0)  g/dL


 


Albumin/Globulin Ratio   0.7 L   (0.9-1.6)  


 


Urine Color     


 


Urine Appearance     


 


Urine pH     (5.0-8.0)  


 


Ur Specific Gravity     (1.001-1.035)  


 


Urine Protein     (NEGATIVE)  mg/dL


 


Urine Glucose (UA)     (NEGATIVE)  mg/dL


 


Urine Ketones     (NEGATIVE)  mg/dL


 


Urine Occult Blood     (NEGATIVE)  


 


Urine Nitrite     (NEGATIVE)  


 


Urine Bilirubin     (NEGATIVE)  


 


Urine Urobilinogen     (<2.0)  EU/dL


 


Ur Leukocyte Esterase     (NEGATIVE)  


 


Urine RBC     (0-2/HPF)  


 


Urine WBC     (0-5/HPF)  


 


Ur Epithelial Cells     (NONE-FEW)  


 


Urine Bacteria     (NEGATIVE)  


 


Blood Type     


 


Antibody Screen     


 


Crossmatch     














  03/28/20 03/28/20 03/28/20 Range/Units





  17:47 21:47 21:47 


 


WBC   10.66   (4.0-11.0)  K/uL


 


RBC   2.74 L   (4.30-5.90)  M/uL


 


Hgb   7.7 L   (12.0-16.0)  g/dL


 


Hct   23.8 L   (36.0-46.0)  %


 


MCV   86.9   (80.0-98.0)  fL


 


MCH   28.1   (27.0-32.0)  pg


 


MCHC   32.4   (31.0-37.0)  g/dL


 


RDW Std Deviation   46.7   (28.0-62.0)  fl


 


RDW Coeff of Pietro   15   (11.0-15.0)  %


 


Plt Count   342   (150-400)  K/uL


 


MPV   9.20   (7.40-12.00)  fL


 


Neut % (Auto)     (48.0-80.0)  %


 


Lymph % (Auto)     (16.0-40.0)  %


 


Mono % (Auto)     (0.0-15.0)  %


 


Eos % (Auto)     (0.0-7.0)  %


 


Baso % (Auto)     (0.0-1.5)  %


 


Neut # (Auto)     (1.4-5.7)  K/uL


 


Lymph # (Auto)     (0.6-2.4)  K/uL


 


Mono # (Auto)     (0.0-0.8)  K/uL


 


Eos # (Auto)     (0.0-0.7)  K/uL


 


Baso # (Auto)     (0.0-0.1)  K/uL


 


Nucleated RBC %   0.7   /100WBC


 


Nucleated RBCs #   0   K/uL


 


INR     


 


Sodium    144  (136-145)  mmol/L


 


Potassium    3.6  (3.5-5.1)  mmol/L


 


Chloride    106  ()  mmol/L


 


Carbon Dioxide    28.3  (21.0-32.0)  mmol/L


 


BUN    11  (7.0-18.0)  mg/dL


 


Creatinine    0.6  (0.6-1.0)  mg/dL


 


Est Cr Clr Drug Dosing    126.05  mL/min


 


Estimated GFR (MDRD)    > 60.0  ml/min


 


Glucose    82  ()  mg/dL


 


Uric Acid    5.6  (2.6-7.2)  mg/dL


 


Calcium    9.1  (8.5-10.1)  mg/dL


 


Magnesium     (1.8-2.4)  mg/dL


 


Total Bilirubin    0.6  (0.2-1.0)  mg/dL


 


AST    28  (15-37)  IU/L


 


ALT    24  (14-63)  IU/L


 


Alkaline Phosphatase    82  ()  U/L


 


Total Protein    6.1 L  (6.4-8.2)  g/dL


 


Albumin    2.5 L  (3.4-5.0)  g/dL


 


Globulin    3.6  (2.6-4.0)  g/dL


 


Albumin/Globulin Ratio    0.7 L  (0.9-1.6)  


 


Urine Color  YELLOW    


 


Urine Appearance  CLEAR    


 


Urine pH  7.0    (5.0-8.0)  


 


Ur Specific Gravity  1.015    (1.001-1.035)  


 


Urine Protein  NEGATIVE    (NEGATIVE)  mg/dL


 


Urine Glucose (UA)  NEGATIVE    (NEGATIVE)  mg/dL


 


Urine Ketones  NEGATIVE    (NEGATIVE)  mg/dL


 


Urine Occult Blood  SMALL H    (NEGATIVE)  


 


Urine Nitrite  NEGATIVE    (NEGATIVE)  


 


Urine Bilirubin  NEGATIVE    (NEGATIVE)  


 


Urine Urobilinogen  0.2    (<2.0)  EU/dL


 


Ur Leukocyte Esterase  NEGATIVE    (NEGATIVE)  


 


Urine RBC  1-3    (0-2/HPF)  


 


Urine WBC  0-1    (0-5/HPF)  


 


Ur Epithelial Cells  MODERATE    (NONE-FEW)  


 


Urine Bacteria  RARE    (NEGATIVE)  


 


Blood Type     


 


Antibody Screen     


 


Crossmatch     














  03/28/20 03/29/20 03/29/20 Range/Units





  21:47 01:35 06:20 


 


WBC     (4.0-11.0)  K/uL


 


RBC     (4.30-5.90)  M/uL


 


Hgb    9.7 L  (12.0-16.0)  g/dL


 


Hct    30.3 L  (36.0-46.0)  %


 


MCV     (80.0-98.0)  fL


 


MCH     (27.0-32.0)  pg


 


MCHC     (31.0-37.0)  g/dL


 


RDW Std Deviation     (28.0-62.0)  fl


 


RDW Coeff of Pietro     (11.0-15.0)  %


 


Plt Count     (150-400)  K/uL


 


MPV     (7.40-12.00)  fL


 


Neut % (Auto)     (48.0-80.0)  %


 


Lymph % (Auto)     (16.0-40.0)  %


 


Mono % (Auto)     (0.0-15.0)  %


 


Eos % (Auto)     (0.0-7.0)  %


 


Baso % (Auto)     (0.0-1.5)  %


 


Neut # (Auto)     (1.4-5.7)  K/uL


 


Lymph # (Auto)     (0.6-2.4)  K/uL


 


Mono # (Auto)     (0.0-0.8)  K/uL


 


Eos # (Auto)     (0.0-0.7)  K/uL


 


Baso # (Auto)     (0.0-0.1)  K/uL


 


Nucleated RBC %     /100WBC


 


Nucleated RBCs #     K/uL


 


INR     


 


Sodium     (136-145)  mmol/L


 


Potassium     (3.5-5.1)  mmol/L


 


Chloride     ()  mmol/L


 


Carbon Dioxide     (21.0-32.0)  mmol/L


 


BUN     (7.0-18.0)  mg/dL


 


Creatinine     (0.6-1.0)  mg/dL


 


Est Cr Clr Drug Dosing     mL/min


 


Estimated GFR (MDRD)     ml/min


 


Glucose     ()  mg/dL


 


Uric Acid     (2.6-7.2)  mg/dL


 


Calcium     (8.5-10.1)  mg/dL


 


Magnesium   3.6 H   (1.8-2.4)  mg/dL


 


Total Bilirubin     (0.2-1.0)  mg/dL


 


AST     (15-37)  IU/L


 


ALT     (14-63)  IU/L


 


Alkaline Phosphatase     ()  U/L


 


Total Protein     (6.4-8.2)  g/dL


 


Albumin     (3.4-5.0)  g/dL


 


Globulin     (2.6-4.0)  g/dL


 


Albumin/Globulin Ratio     (0.9-1.6)  


 


Urine Color     


 


Urine Appearance     


 


Urine pH     (5.0-8.0)  


 


Ur Specific Gravity     (1.001-1.035)  


 


Urine Protein     (NEGATIVE)  mg/dL


 


Urine Glucose (UA)     (NEGATIVE)  mg/dL


 


Urine Ketones     (NEGATIVE)  mg/dL


 


Urine Occult Blood     (NEGATIVE)  


 


Urine Nitrite     (NEGATIVE)  


 


Urine Bilirubin     (NEGATIVE)  


 


Urine Urobilinogen     (<2.0)  EU/dL


 


Ur Leukocyte Esterase     (NEGATIVE)  


 


Urine RBC     (0-2/HPF)  


 


Urine WBC     (0-5/HPF)  


 


Ur Epithelial Cells     (NONE-FEW)  


 


Urine Bacteria     (NEGATIVE)  


 


Blood Type  O POSITIVE    


 


Antibody Screen  NEGATIVE    


 


Crossmatch  See Detail    














  03/29/20 Range/Units





  06:20 


 


WBC   (4.0-11.0)  K/uL


 


RBC   (4.30-5.90)  M/uL


 


Hgb   (12.0-16.0)  g/dL


 


Hct   (36.0-46.0)  %


 


MCV   (80.0-98.0)  fL


 


MCH   (27.0-32.0)  pg


 


MCHC   (31.0-37.0)  g/dL


 


RDW Std Deviation   (28.0-62.0)  fl


 


RDW Coeff of Pietro   (11.0-15.0)  %


 


Plt Count   (150-400)  K/uL


 


MPV   (7.40-12.00)  fL


 


Neut % (Auto)   (48.0-80.0)  %


 


Lymph % (Auto)   (16.0-40.0)  %


 


Mono % (Auto)   (0.0-15.0)  %


 


Eos % (Auto)   (0.0-7.0)  %


 


Baso % (Auto)   (0.0-1.5)  %


 


Neut # (Auto)   (1.4-5.7)  K/uL


 


Lymph # (Auto)   (0.6-2.4)  K/uL


 


Mono # (Auto)   (0.0-0.8)  K/uL


 


Eos # (Auto)   (0.0-0.7)  K/uL


 


Baso # (Auto)   (0.0-0.1)  K/uL


 


Nucleated RBC %   /100WBC


 


Nucleated RBCs #   K/uL


 


INR   


 


Sodium   (136-145)  mmol/L


 


Potassium   (3.5-5.1)  mmol/L


 


Chloride   ()  mmol/L


 


Carbon Dioxide   (21.0-32.0)  mmol/L


 


BUN   (7.0-18.0)  mg/dL


 


Creatinine   (0.6-1.0)  mg/dL


 


Est Cr Clr Drug Dosing   mL/min


 


Estimated GFR (MDRD)   ml/min


 


Glucose   ()  mg/dL


 


Uric Acid   (2.6-7.2)  mg/dL


 


Calcium   (8.5-10.1)  mg/dL


 


Magnesium  4.8 H  (1.8-2.4)  mg/dL


 


Total Bilirubin   (0.2-1.0)  mg/dL


 


AST   (15-37)  IU/L


 


ALT   (14-63)  IU/L


 


Alkaline Phosphatase   ()  U/L


 


Total Protein   (6.4-8.2)  g/dL


 


Albumin   (3.4-5.0)  g/dL


 


Globulin   (2.6-4.0)  g/dL


 


Albumin/Globulin Ratio   (0.9-1.6)  


 


Urine Color   


 


Urine Appearance   


 


Urine pH   (5.0-8.0)  


 


Ur Specific Gravity   (1.001-1.035)  


 


Urine Protein   (NEGATIVE)  mg/dL


 


Urine Glucose (UA)   (NEGATIVE)  mg/dL


 


Urine Ketones   (NEGATIVE)  mg/dL


 


Urine Occult Blood   (NEGATIVE)  


 


Urine Nitrite   (NEGATIVE)  


 


Urine Bilirubin   (NEGATIVE)  


 


Urine Urobilinogen   (<2.0)  EU/dL


 


Ur Leukocyte Esterase   (NEGATIVE)  


 


Urine RBC   (0-2/HPF)  


 


Urine WBC   (0-5/HPF)  


 


Ur Epithelial Cells   (NONE-FEW)  


 


Urine Bacteria   (NEGATIVE)  


 


Blood Type   


 


Antibody Screen   


 


Crossmatch   











Med Orders - Current: 


 Current Medications





Calcium Gluconate (Calcium Gluconate)  1 gm IV ASDIRECTED PRN


   PRN Reason: respiratory distress


Magnesium Sulfate (Magnesium Sulfate In Water Premix)  20 gm in 500 mls @ 50 mls

/hr IV ASDIRECTED LEVY; Protocol


   Last Admin: 03/29/20 08:04 Dose:  2 gm/hr, 50 mls/hr


Lactated Ringer's (Ringers, Lactated)  1,000 mls @ 5 mls/hr IV ASDIRECTED LEVY


   Last Admin: 03/28/20 21:30 Dose:  5 mls/hr


Nifedipine (Procardia Xl)  30 mg PO BID LEVY


Oxycodone/Acetaminophen (Percocet 325-5 Mg)  2 tab PO Q4H PRN


   PRN Reason: Pain (severe 7-10)


   Last Admin: 03/29/20 10:38 Dose:  2 tab


Oxycodone/Acetaminophen (Percocet 325-5 Mg)  1 tab PO Q4H PRN


   PRN Reason: Pain (moderate 4-6)


Sodium Chloride (Saline Flush)  10 ml FLUSH ASDIRECTED PRN


   PRN Reason: Keep Vein Open


Sodium Chloride (Saline Flush)  2.5 ml FLUSH ASDIRECTED PRN


   PRN Reason: Keep Vein Open


Sodium Chloride (Saline Flush)  10 ml FLUSH ASDIRECTED PRN


   PRN Reason: Keep Vein Open


Sodium Chloride (Saline Flush)  2.5 ml FLUSH ASDIRECTED PRN


   PRN Reason: Keep Vein Open


Sodium Chloride (Normal Saline)  10 ml IV ASDIRECTED PRN


   PRN Reason: IV Use





Discontinued Medications





Hydralazine HCl (Apresoline)  20 mg IVPUSH ONETIME ONE


   Stop: 03/28/20 18:07


   Last Admin: 03/28/20 18:28 Dose:  20 mg


Magnesium Sulfate 4 gm/ Premix  100 mls @ 300 mls/hr IV BOLUS ONE


   Stop: 03/28/20 21:24


   Last Admin: 03/28/20 21:35 Dose:  300 mls/hr


Magnesium Sulfate (Magnesium Sulfate In Water Premix) Confirm Administered Dose 

100 mls @ as directed .ROUTE .STK-MED ONE


   Stop: 03/28/20 21:17


   Last Admin: 03/29/20 08:12 Dose:  Not Given


Magnesium Sulfate (Magnesium Sulfate In Water Premix) Confirm Administered Dose 

20 gm in 500 mls @ as directed .ROUTE .STK-MED ONE


   Stop: 03/28/20 21:18


   Last Admin: 03/29/20 08:12 Dose:  Not Given


Labetalol HCl (Normodyne)  20 mg IVPUSH ONETIME ONE; Protocol


   Stop: 03/28/20 19:35


   Last Admin: 03/28/20 19:49 Dose:  20 mg











- Exam


General: Alert, Oriented


HEENT: Pupils Equal, Pupils Reactive, EOMI, Mucous Membr. Moist/Pink


Neck: Supple


Lungs: Clear to Auscultation, Normal Respiratory Effort


Cardiovascular: Regular Rate, Regular Rhythm


GI/Abdominal Exam: Normal Bowel Sounds, Soft, Non-Tender, No Organomegaly, No 

Distention, No Abnormal Bruit, No Mass, Pelvis Stable


 (Female) Exam: Normal External Exam, Normal Speculum Exam, Normal Bimanual 

Exam


Back Exam: Normal Inspection, Full Range of Motion


Extremities: Normal Inspection, Normal Range of Motion, Non-Tender, No Pedal 

Edema, Normal Capillary Refill


Skin: Warm, Dry, Intact


Wound/Incisions: Healing Well


Neurological: No New Focal Deficit


Psy/Mental Status: Alert, Normal Affect, Normal Mood





Sepsis Event Note





- Evaluation


Sepsis Screening Result: No Definite Risk





- Focused Exam


Vital Signs: 


 Vital Signs











  Temp Temp Pulse Resp BP Pulse Ox


 


 03/29/20 11:00    110 H  20  169/106 H  90 L


 


 03/29/20 10:00    106 H  22 H  168/101 H  93 L


 


 03/29/20 09:00   36.9 C  98  19  178/79 H  93 L


 


 03/29/20 08:00    104 H  27 H  171/94 H  92 L


 


 03/29/20 07:30   36.9 C  105 H  24 H  162/99 H  95


 


 03/29/20 04:30     20   88 L


 


 03/29/20 03:45  36.8 C   103 H  19  165/101 H  95


 


 03/29/20 03:30  36.9 C   107 H  19  160/93 H 


 


 03/29/20 03:15  36.9 C   92  18  160/93 H  96


 


 03/29/20 02:05  37.1 C   103 H  19  169/92 H  95


 


 03/29/20 01:50  36.8 C   97  21 H   95


 


 03/29/20 01:35  36.9 C   102 H  22 H  166/89 H  97











Date Exam was Performed: 03/29/20


Time Exam was Performed: 11:16





- Problem List Review


Problem List Initiated/Reviewed/Updated: Yes





- My Orders


Last 24 Hours: 


My Active Orders





03/28/20 21:05


Patient Status [ADT] Routine 


Bedrest [RC] ASDIRECTED 


Communication Order [RC] PRN 


Communication Order [RC] PRN 


Height and Weight [RC] DAILY 


Intake and Output [RC] QSHIFT 


Notify Provider [RC] PRN 


Oxygen Therapy [RC] PRN 


Vital Signs [RC] ASDIRECTED 


Calcium Gluconate   1 gm IV ASDIRECTED PRN 


Sodium Chloride 0.9% [Normal Saline]   10 ml IV ASDIRECTED PRN 


Sodium Chloride 0.9% [Saline Flush]   10 ml FLUSH ASDIRECTED PRN 


Sodium Chloride 0.9% [Saline Flush]   2.5 ml FLUSH ASDIRECTED PRN 


DVT/VTE Prophylaxis Reflex [OM.PC] Routine 


Electronic Fetal Heart Tones Ext w TOCO [WOMSER] Per Unit Routine 


Peripheral IV Insertion Adult [OM.PC] Routine 





03/28/20 21:06


Antiembolic Devices [RC] .Routine 


VTE/DVT Education [RC] PER UNIT ROUTINE 





03/28/20 21:10


Equipment to Bedside [RC] PRN 


Notify Provider Status Change [RC] ASDIRECTED 





03/28/20 21:15


Magnesium Sulfate/Water [Magnesium Sulfate in Water Premix] 20 gm in 500 ml IV 

ASDIRECTED 


Deep Tendon Reflexes [WOMSER] Q1H 





03/28/20 21:45


Acetaminophen/oxyCODONE [Percocet 325-5 MG]   1 tab PO Q4H PRN 


Acetaminophen/oxyCODONE [Percocet 325-5 MG]   2 tab PO Q4H PRN 





03/28/20 21:53


Transfuse PRBC [Transfuse Red Blood Cells] [COMM] Stat 





03/28/20 22:15


Lactated Ringers [Ringers, Lactated] 1,000 ml IV ASDIRECTED 


Deep Tendon Reflexes [WOMSER] Q1H 





03/28/20 23:15


Deep Tendon Reflexes [WOMSER] Q1H 





03/29/20 00:15


Deep Tendon Reflexes [WOMSER] Q1H 





03/29/20 01:15


Deep Tendon Reflexes [WOMSER] Q1H 





03/29/20 02:15


Deep Tendon Reflexes [WOMSER] Q1H 





03/29/20 03:15


Deep Tendon Reflexes [WOMSER] Q1H 





03/29/20 04:15


Deep Tendon Reflexes [WOMSER] Q1H 





03/29/20 05:15


Deep Tendon Reflexes [WOMSER] Q1 





03/29/20 06:15


Deep Tendon Reflexes [WOMSER] Q1 





03/29/20 07:15


Deep Tendon Reflexes [WOMSER] Q1H 





03/29/20 08:15


Deep Tendon Reflexes [WOMSER] Q1H 





03/29/20 09:15


Deep Tendon Reflexes [WOMSER] Q1H 





03/29/20 10:15


Deep Tendon Reflexes [WOMSER] Q1H 





03/29/20 11:15


NIFEdipine [Procardia XL]   30 mg PO BID 


Deep Tendon Reflexes [WOMSER] Q1H 





03/29/20 12:15


Deep Tendon Reflexes [WOMSER] Q1H 





03/29/20 13:15


Deep Tendon Reflexes [WOMSER] Q1H 





03/29/20 13:30


MAGNESIUM [CHEM] Q6H 





03/29/20 14:15


Deep Tendon Reflexes [WOMSER] Q1H 





03/29/20 15:15


Deep Tendon Reflexes [WOMSER] Q1H 





03/29/20 16:15


Deep Tendon Reflexes [WOMSER] Q1H 





03/29/20 17:15


Deep Tendon Reflexes [WOMSER] Q1H 





03/29/20 18:15


Deep Tendon Reflexes [WOMSER] Q1H 





03/29/20 19:15


Deep Tendon Reflexes [WOMSER] Q1H 





03/29/20 19:30


MAGNESIUM [CHEM] Q6H 





03/29/20 20:15


Deep Tendon Reflexes [WOMSER] Q1H 





03/30/20 01:30


MAGNESIUM [CHEM] Q6H 














- Assessment


Assessment:: 





pain under control. swelling is improved blood pr is under is improving.





- Plan


Plan:: 





S/P c/section 4 day ago for sever preeclampsia. admitted with elevated blood 

pr.

## 2020-03-30 LAB
BUN SERPL-MCNC: 8 MG/DL (ref 7–18)
CHLORIDE SERPL-SCNC: 104 MMOL/L (ref 98–107)
CO2 SERPL-SCNC: 24.2 MMOL/L (ref 21–32)
GLUCOSE SERPL-MCNC: 87 MG/DL (ref 74–106)
POTASSIUM SERPL-SCNC: 3.8 MMOL/L (ref 3.5–5.1)
SODIUM SERPL-SCNC: 141 MMOL/L (ref 136–145)

## 2020-03-30 RX ADMIN — OXYCODONE HYDROCHLORIDE AND ACETAMINOPHEN PRN TAB: 5; 325 TABLET ORAL at 07:33

## 2020-03-30 RX ADMIN — OXYCODONE HYDROCHLORIDE AND ACETAMINOPHEN PRN TAB: 5; 325 TABLET ORAL at 02:39

## 2020-03-30 RX ADMIN — OXYCODONE HYDROCHLORIDE AND ACETAMINOPHEN PRN TAB: 5; 325 TABLET ORAL at 11:22

## 2020-03-30 NOTE — [id number]
**Discharge Summary





- Hospital Course


Diagnosis: Stroke: No





- Discharge Data


Discharge Date: 03/30/20


Discharge Disposition: Home, Self-Care 01


Condition: Fair





- Referral to Home Health


Primary Care Physician: 


Lydia Weathers CNM








- Patient Instructions


Diet: Usual Diet as Tolerated


Activity: As Tolerated


Driving: Do Not Drive


Showering/Bathing: May Shower


Wound/Incision Care: Keep Operative Site/Wound Site Clean and Dry


Notify Provider of: Fever, Increased Pain





- Discharge Plan


Home Medications: 


 Home Meds





Acetaminophen/oxyCODONE [Percocet 325-5 MG] 5 - 325 mg PO Q4HR PRN 03/28/20 [

History]


Labetalol [Normodyne] 200 mg PO DAILY 03/28/20 [History]








Forms:  ED Department Discharge


Referrals: 


[facility] [Outside]


Forrest De Leon MD [Physician] - 04/06/20 3:00 pm


Lydia Weathers CNM [Primary Care Provider] - 05/04/20 2:15 pm





- Discharge Summary/Plan Comment


DC Time >30 min.: Yes





- General Info


Date of Service: 03/30/20


Functional Status: Reports: Pain Controlled





- Review of Systems


General: Reports: No Symptoms


HEENT: Reports: No Symptoms


Pulmonary: Reports: No Symptoms


Cardiovascular: Reports: No Symptoms


Gastrointestinal: Reports: No Symptoms


Genitourinary: Reports: No Symptoms


Musculoskeletal: Reports: No Symptoms


Skin: Reports: No Symptoms


Neurological: Reports: No Symptoms


Psychiatric: Reports: No Symptoms





- Patient Data


Vitals - Most Recent: 


 Last Vital Signs











Temp  36.7 C   03/30/20 12:00


 


Pulse  103 H  03/30/20 12:00


 


Resp  19   03/30/20 12:00


 


BP  145/97 H  03/30/20 12:00


 


Pulse Ox  97   03/30/20 12:00











Weight - Most Recent: 98.43 kg


I&O - Last 24 hours: 


 Intake & Output











 03/29/20 03/30/20 03/30/20





 22:59 06:59 14:59


 


Output Total 900  


 


Balance -900  











Lab Results - Last 24 hrs: 


 Laboratory Results - last 24 hr











  03/29/20 03/30/20 03/30/20 Range/Units





  13:26 08:30 09:05 


 


WBC    10.38  (4.0-11.0)  K/uL


 


RBC    4.07 L  (4.30-5.90)  M/uL


 


Hgb    11.5 L  (12.0-16.0)  g/dL


 


Hct    35.5 L  (36.0-46.0)  %


 


MCV    87.2  (80.0-98.0)  fL


 


MCH    28.3  (27.0-32.0)  pg


 


MCHC    32.4  (31.0-37.0)  g/dL


 


RDW Std Deviation    48.1  (28.0-62.0)  fl


 


RDW Coeff of Pietro    16 H  (11.0-15.0)  %


 


Plt Count    360  (150-400)  K/uL


 


MPV    9.30  (7.40-12.00)  fL


 


Nucleated RBC %    0.4  /100WBC


 


Nucleated RBCs #    0  K/uL


 


Sodium   141   (136-145)  mmol/L


 


Potassium   3.8   (3.5-5.1)  mmol/L


 


Chloride   104   ()  mmol/L


 


Carbon Dioxide   24.2   (21.0-32.0)  mmol/L


 


BUN   8   (7.0-18.0)  mg/dL


 


Creatinine   0.6   (0.6-1.0)  mg/dL


 


Est Cr Clr Drug Dosing   126.05   mL/min


 


Estimated GFR (MDRD)   > 60.0   ml/min


 


Glucose   87   ()  mg/dL


 


Calcium   8.0 L   (8.5-10.1)  mg/dL


 


Magnesium  5.4 H    (1.8-2.4)  mg/dL


 


Total Bilirubin   0.6   (0.2-1.0)  mg/dL


 


AST   22   (15-37)  IU/L


 


ALT   19   (14-63)  IU/L


 


Alkaline Phosphatase   92   ()  U/L


 


Total Protein   6.3 L   (6.4-8.2)  g/dL


 


Albumin   2.7 L   (3.4-5.0)  g/dL


 


Globulin   3.6   (2.6-4.0)  g/dL


 


Albumin/Globulin Ratio   0.8 L   (0.9-1.6)  


 


Ur Random Creatinine     mg/dL


 


U Random Total Protein     (<11.9)  mg/dL


 


Protein/Creatinin Ratio     














  03/30/20 Range/Units





  12:40 


 


WBC   (4.0-11.0)  K/uL


 


RBC   (4.30-5.90)  M/uL


 


Hgb   (12.0-16.0)  g/dL


 


Hct   (36.0-46.0)  %


 


MCV   (80.0-98.0)  fL


 


MCH   (27.0-32.0)  pg


 


MCHC   (31.0-37.0)  g/dL


 


RDW Std Deviation   (28.0-62.0)  fl


 


RDW Coeff of Pietro   (11.0-15.0)  %


 


Plt Count   (150-400)  K/uL


 


MPV   (7.40-12.00)  fL


 


Nucleated RBC %   /100WBC


 


Nucleated RBCs #   K/uL


 


Sodium   (136-145)  mmol/L


 


Potassium   (3.5-5.1)  mmol/L


 


Chloride   ()  mmol/L


 


Carbon Dioxide   (21.0-32.0)  mmol/L


 


BUN   (7.0-18.0)  mg/dL


 


Creatinine   (0.6-1.0)  mg/dL


 


Est Cr Clr Drug Dosing   mL/min


 


Estimated GFR (MDRD)   ml/min


 


Glucose   ()  mg/dL


 


Calcium   (8.5-10.1)  mg/dL


 


Magnesium   (1.8-2.4)  mg/dL


 


Total Bilirubin   (0.2-1.0)  mg/dL


 


AST   (15-37)  IU/L


 


ALT   (14-63)  IU/L


 


Alkaline Phosphatase   ()  U/L


 


Total Protein   (6.4-8.2)  g/dL


 


Albumin   (3.4-5.0)  g/dL


 


Globulin   (2.6-4.0)  g/dL


 


Albumin/Globulin Ratio   (0.9-1.6)  


 


Ur Random Creatinine  24.9  mg/dL


 


U Random Total Protein  18.7 H  (<11.9)  mg/dL


 


Protein/Creatinin Ratio  0.8  











Med Orders - Current: 


 Current Medications





Calcium Gluconate (Calcium Gluconate)  1 gm IV ASDIRECTED PRN


   PRN Reason: respiratory distress


Emollient Ointment (Lansinoh Hpa)  0 gm TOP ASDIRECTED PRN


   PRN Reason: Pain


Magnesium Sulfate (Magnesium Sulfate In Water Premix)  20 gm in 500 mls @ 50 mls

/hr IV ASDIRECTED [facility]; Protocol


   Last Admin: 03/29/20 08:04 Dose:  2 gm/hr, 50 mls/hr


Lactated Ringer's (Ringers, Lactated)  1,000 mls @ 5 mls/hr IV ASDIRECTED [facility]


   Last Admin: 03/28/20 21:30 Dose:  5 mls/hr


Nifedipine (Procardia Xl)  60 mg PO BID [facility]


   Last Admin: 03/30/20 08:33 Dose:  60 mg


Oxycodone/Acetaminophen (Percocet 325-5 Mg)  2 tab PO Q4H PRN


   PRN Reason: Pain (severe 7-10)


   Last Admin: 03/30/20 11:22 Dose:  2 tab


Oxycodone/Acetaminophen (Percocet 325-5 Mg)  1 tab PO Q4H PRN


   PRN Reason: Pain (moderate 4-6)


Sodium Chloride (Saline Flush)  10 ml FLUSH ASDIRECTED PRN


   PRN Reason: Keep Vein Open


Sodium Chloride (Saline Flush)  2.5 ml FLUSH ASDIRECTED PRN


   PRN Reason: Keep Vein Open


Sodium Chloride (Saline Flush)  10 ml FLUSH ASDIRECTED PRN


   PRN Reason: Keep Vein Open


Sodium Chloride (Saline Flush)  2.5 ml FLUSH ASDIRECTED PRN


   PRN Reason: Keep Vein Open


Sodium Chloride (Normal Saline)  10 ml IV ASDIRECTED PRN


   PRN Reason: IV Use





Discontinued Medications





Emollient Ointment (Lansinoh Hpa)  0 gm TOP ASDIRECTED PRN


   PRN Reason: Pain


Emollient Ointment (Lansinoh Hpa) Confirm Administered Dose 7 gm .ROUTE .STK-

MED ONE


   Stop: 03/29/20 21:15


   Last Admin: 03/29/20 21:23 Dose:  7 gm


Furosemide (Lasix)  20 mg IVPUSH NOW ONE


   Stop: 03/29/20 15:06


   Last Admin: 03/29/20 15:22 Dose:  20 mg


Hydralazine HCl (Apresoline)  20 mg IVPUSH ONETIME ONE


   Stop: 03/28/20 18:07


   Last Admin: 03/28/20 18:28 Dose:  20 mg


Magnesium Sulfate 4 gm/ Premix  100 mls @ 300 mls/hr IV BOLUS ONE


   Stop: 03/28/20 21:24


   Last Admin: 03/28/20 21:35 Dose:  300 mls/hr


Magnesium Sulfate (Magnesium Sulfate In Water Premix) Confirm Administered Dose 

100 mls @ as directed .ROUTE .STK-MED ONE


   Stop: 03/28/20 21:17


   Last Admin: 03/29/20 08:12 Dose:  Not Given


Magnesium Sulfate (Magnesium Sulfate In Water Premix) Confirm Administered Dose 

20 gm in 500 mls @ as directed .ROUTE .STK-MED ONE


   Stop: 03/28/20 21:18


   Last Admin: 03/29/20 08:12 Dose:  Not Given


Labetalol HCl (Normodyne)  20 mg IVPUSH ONETIME ONE; Protocol


   Stop: 03/28/20 19:35


   Last Admin: 03/28/20 19:49 Dose:  20 mg


Nifedipine (Procardia Xl)  30 mg PO BID [facility]


   Last Admin: 03/29/20 21:19 Dose:  30 mg











- Exam


General: Reports: Alert, Oriented


HEENT: Reports: Pupils Equal, Pupils Reactive, EOMI, Mucous Membr. Moist/Pink


Neck: Reports: Supple


Lungs: Reports: Clear to Auscultation, Normal Respiratory Effort


Cardiovascular: Reports: Regular Rate, Regular Rhythm


GI/Abdominal Exam: Normal Bowel Sounds, Soft, Non-Tender, No Organomegaly, No 

Distention, No Abnormal Bruit, No Mass, Pelvis Stable


 (Female) Exam: Normal External Exam, Normal Speculum Exam, Normal Bimanual 

Exam


Rectal (Female) Exam: Normal Exam, Normal Rectal Tone


Back Exam: Reports: Normal Inspection, Full Range of Motion


Extremities: Normal Inspection, Normal Range of Motion, Non-Tender, No Pedal 

Edema, Normal Capillary Refill


Skin: Reports: Warm, Dry, Intact


Wound/Incisions: Reports: Healing Well


Neurological: Reports: No New Focal Deficit


Psy/Mental Status: Reports: Alert, Normal Affect, Normal Mood

## 2020-03-30 NOTE — PCM.PN
- General Info


Date of Service: 03/30/20


Functional Status: Reports: Pain Controlled





- Review of Systems


General: Reports: No Symptoms


HEENT: Reports: No Symptoms


Pulmonary: Reports: No Symptoms


Cardiovascular: Reports: No Symptoms


Gastrointestinal: Reports: No Symptoms


Genitourinary: Reports: No Symptoms


Musculoskeletal: Reports: No Symptoms


Skin: Reports: No Symptoms


Neurological: Reports: No Symptoms


Psychiatric: Reports: No Symptoms





- Patient Data


Vitals - Most Recent: 


 Last Vital Signs











Temp  36.7 C   03/30/20 04:20


 


Pulse  91   03/30/20 04:20


 


Resp  18   03/30/20 04:20


 


BP  158/101 H  03/30/20 08:33


 


Pulse Ox  97   03/30/20 04:20











Weight - Most Recent: 98.43 kg


I&O - Last 24 Hours: 


 Intake & Output











 03/29/20 03/30/20 03/30/20





 22:59 06:59 14:59


 


Output Total 900  


 


Balance -900  











Lab Results Last 24 Hours: 


 Laboratory Results - last 24 hr











  03/29/20 Range/Units





  13:26 


 


Magnesium  5.4 H  (1.8-2.4)  mg/dL











Med Orders - Current: 


 Current Medications





Calcium Gluconate (Calcium Gluconate)  1 gm IV ASDIRECTED PRN


   PRN Reason: respiratory distress


Emollient Ointment (Lansinoh Hpa)  0 gm TOP ASDIRECTED PRN


   PRN Reason: Pain


Magnesium Sulfate (Magnesium Sulfate In Water Premix)  20 gm in 500 mls @ 50 mls

/hr IV ASDIRECTED [facility]; Protocol


   Last Admin: 03/29/20 08:04 Dose:  2 gm/hr, 50 mls/hr


Lactated Ringer's (Ringers, Lactated)  1,000 mls @ 5 mls/hr IV ASDIRECTED [facility]


   Last Admin: 03/28/20 21:30 Dose:  5 mls/hr


Nifedipine (Procardia Xl)  60 mg PO BID [facility]


   Last Admin: 03/30/20 08:33 Dose:  60 mg


Oxycodone/Acetaminophen (Percocet 325-5 Mg)  2 tab PO Q4H PRN


   PRN Reason: Pain (severe 7-10)


   Last Admin: 03/30/20 07:33 Dose:  2 tab


Oxycodone/Acetaminophen (Percocet 325-5 Mg)  1 tab PO Q4H PRN


   PRN Reason: Pain (moderate 4-6)


Sodium Chloride (Saline Flush)  10 ml FLUSH ASDIRECTED PRN


   PRN Reason: Keep Vein Open


Sodium Chloride (Saline Flush)  2.5 ml FLUSH ASDIRECTED PRN


   PRN Reason: Keep Vein Open


Sodium Chloride (Saline Flush)  10 ml FLUSH ASDIRECTED PRN


   PRN Reason: Keep Vein Open


Sodium Chloride (Saline Flush)  2.5 ml FLUSH ASDIRECTED PRN


   PRN Reason: Keep Vein Open


Sodium Chloride (Normal Saline)  10 ml IV ASDIRECTED PRN


   PRN Reason: IV Use





Discontinued Medications





Emollient Ointment (Lansinoh Hpa)  0 gm TOP ASDIRECTED PRN


   PRN Reason: Pain


Emollient Ointment (Lansinoh Hpa) Confirm Administered Dose 7 gm .ROUTE .STK-

MED ONE


   Stop: 03/29/20 21:15


   Last Admin: 03/29/20 21:23 Dose:  7 gm


Furosemide (Lasix)  20 mg IVPUSH NOW ONE


   Stop: 03/29/20 15:06


   Last Admin: 03/29/20 15:22 Dose:  20 mg


Hydralazine HCl (Apresoline)  20 mg IVPUSH ONETIME ONE


   Stop: 03/28/20 18:07


   Last Admin: 03/28/20 18:28 Dose:  20 mg


Magnesium Sulfate 4 gm/ Premix  100 mls @ 300 mls/hr IV BOLUS ONE


   Stop: 03/28/20 21:24


   Last Admin: 03/28/20 21:35 Dose:  300 mls/hr


Magnesium Sulfate (Magnesium Sulfate In Water Premix) Confirm Administered Dose 

100 mls @ as directed .ROUTE .STK-MED ONE


   Stop: 03/28/20 21:17


   Last Admin: 03/29/20 08:12 Dose:  Not Given


Magnesium Sulfate (Magnesium Sulfate In Water Premix) Confirm Administered Dose 

20 gm in 500 mls @ as directed .ROUTE .STK-MED ONE


   Stop: 03/28/20 21:18


   Last Admin: 03/29/20 08:12 Dose:  Not Given


Labetalol HCl (Normodyne)  20 mg IVPUSH ONETIME ONE; Protocol


   Stop: 03/28/20 19:35


   Last Admin: 03/28/20 19:49 Dose:  20 mg


Nifedipine (Procardia Xl)  30 mg PO BID [facility]


   Last Admin: 03/29/20 21:19 Dose:  30 mg











- Exam


General: Alert, Oriented


HEENT: Pupils Equal, Pupils Reactive, EOMI, Mucous Membr. Moist/Pink


Neck: Supple


Lungs: Clear to Auscultation, Normal Respiratory Effort


Cardiovascular: Regular Rate, Regular Rhythm


GI/Abdominal Exam: Normal Bowel Sounds, Soft, Non-Tender, No Organomegaly, No 

Distention, No Abnormal Bruit, No Mass, Pelvis Stable


 (Female) Exam: Normal External Exam, Normal Speculum Exam, Normal Bimanual 

Exam


Back Exam: Normal Inspection, Full Range of Motion


Extremities: Normal Inspection, Normal Range of Motion, Non-Tender, No Pedal 

Edema, Normal Capillary Refill


Skin: Warm, Dry, Intact


Wound/Incisions: Healing Well


Neurological: No New Focal Deficit


Psy/Mental Status: Alert, Normal Affect, Normal Mood





Sepsis Event Note





- Evaluation


Sepsis Screening Result: No Definite Risk





- Focused Exam


Vital Signs: 


 Vital Signs











  Temp Pulse Resp BP BP Pulse Ox


 


 03/30/20 08:33     158/101 H  


 


 03/30/20 04:20  36.7 C  91  18   149/103 H  97


 


 03/30/20 00:28  36.8 C  98  19   142/96 H  97


 


 03/29/20 22:47   102 H    157/97 H  96


 


 03/29/20 21:19     145/93 H  











Date Exam was Performed: 03/30/20


Time Exam was Performed: 09:13





- Problem List Review


Problem List Initiated/Reviewed/Updated: Yes





- My Orders


Last 24 Hours: 


My Active Orders





03/29/20 08:15


Deep Tendon Reflexes [[facility]] [facility] 





03/29/20 09:15


Deep Tendon Reflexes [WOMSER] [facility] 





03/29/20 10:15


Deep Tendon Reflexes [WOMSER] [facility] 





03/29/20 11:15


Deep Tendon Reflexes [WOMSER] [facility] 





03/29/20 12:15


Deep Tendon Reflexes [WOMSER] [facility] 





03/29/20 13:15


Deep Tendon Reflexes [WOMSER] [facility] 





03/29/20 14:15


Deep Tendon Reflexes [WOMSER] [facility] 





03/29/20 15:15


Deep Tendon Reflexes [WOMSER] [facility] 





03/29/20 16:15


Deep Tendon Reflexes [WOMSER] [facility] 





03/29/20 17:15


Deep Tendon Reflexes [WOMSER] [facility] 





03/29/20 18:15


Deep Tendon Reflexes [WOMSER] Q1H 





03/29/20 19:15


Deep Tendon Reflexes [WOMSER] Q1H 





03/29/20 20:15


Deep Tendon Reflexes [WOMSER] Q1H 





03/29/20 21:00


Lanolin [Lansinoh HPA]   0 gm TOP ASDIRECTED PRN 





03/30/20 09:00


NIFEdipine [Procardia XL]   60 mg PO BID 





03/30/20 Breakfast


Regular Diet [DIET] 














- Assessment


Assessment:: 





pain under control. swelling is improved blood pr is under is improving.


3/30/20


Overall patient is status is improving her swelling is improving and there had 

incision hematoma is getting better I am planning to used to P code dressing on 

her today and we would consider discharging her either late this afternoon or 

tomorrow depending on her blood pressure responding to the medication





- Plan


Plan:: 





S/P c/section 4 day ago for sever preeclampsia. admitted with elevated blood 

pr.

## 2020-05-18 ENCOUNTER — HOSPITAL ENCOUNTER (EMERGENCY)
Dept: HOSPITAL 56 - MW.ED | Age: 35
Discharge: HOME | End: 2020-05-18
Payer: MEDICAID

## 2020-05-18 DIAGNOSIS — I10: ICD-10-CM

## 2020-05-18 DIAGNOSIS — M54.42: Primary | ICD-10-CM

## 2020-05-18 DIAGNOSIS — Z88.1: ICD-10-CM

## 2020-05-18 DIAGNOSIS — Z88.2: ICD-10-CM

## 2020-05-18 DIAGNOSIS — Z79.899: ICD-10-CM

## 2020-05-18 DIAGNOSIS — J45.909: ICD-10-CM

## 2020-05-18 PROCEDURE — 99284 EMERGENCY DEPT VISIT MOD MDM: CPT

## 2020-05-18 PROCEDURE — 96375 TX/PRO/DX INJ NEW DRUG ADDON: CPT

## 2020-05-18 PROCEDURE — 96376 TX/PRO/DX INJ SAME DRUG ADON: CPT

## 2020-05-18 PROCEDURE — 96374 THER/PROPH/DIAG INJ IV PUSH: CPT

## 2020-05-18 NOTE — EDM.PDOC
ED HPI GENERAL MEDICAL PROBLEM





- General


Chief Complaint: Back Pain or Injury


Stated Complaint: BACK PAIN


Time Seen by Provider: 05/18/20 18:48


Source of Information: Reports: Patient, EMS


History Limitations: Reports: No Limitations





- History of Present Illness


INITIAL COMMENTS - FREE TEXT/NARRATIVE: 





34-year-old female with a past medical history of postpartum eclampsia and 

recent diagnosis of lumbar disc herniation presenting the emergency department 

by ambulance complaining of low back pain.  Symptoms present for the past 

several weeks.  She is followed by a spine surgeon who is preparing to perform 

elective spinal decompression surgery.





Underwent MRI of the lumbar spine on April 9, 2020 showing small disc 

herniation L5-S1 compressing the left S1 nerve root and a broad-based disc 

herniation at L4-5 effacing the fat around the L4 nerve root.





This evening, she presents to the emergency department by EMS complaining of 

worsening of her chronic low back pain.  She isolates her pain to the low back 

and states that it radiates around the posterior aspect the left thigh to the 

foot.  She states that this pattern of pain is identical to her known sciatica 

and denies any new pain distribution or neurologic symptoms today.  She 

complains of chronic numbness to the left medial thigh that is not particularly 

worse today compared to usual.  Took 2 acetaminophen tablets prior to arrival 

and received fentanyl IV from EMS.





Denies any recent trauma, new neurologic changes, bowel/bladder retention or 

incontinence, history of autoimmune disease, fever, immunosuppression, steroid 

use, personal history of malignancy.


  ** Lower Back


Pain Score (Numeric/FACES): 10





- Related Data


 Allergies











Allergy/AdvReac Type Severity Reaction Status Date / Time


 


ciprofloxacin [From Cipro] Allergy  Hives Verified 03/28/20 17:53


 


Sulfa (Sulfonamide Allergy  Swelling Verified 03/28/20 17:53





Antibiotics)     











Home Meds: 


 Home Meds





Acetaminophen/oxyCODONE [Percocet 325-5 MG] 5 - 325 mg PO Q4HR PRN 03/28/20 [

History]


Labetalol [Normodyne] 200 mg PO DAILY 03/28/20 [History]


Acetaminophen/oxyCODONE [Percocet 325-5 MG] 1 - 2 each PO Q6H PRN #15 tab 05/18/ 20 [Rx]











Past Medical History


HEENT History: Reports: None


Cardiovascular History: Reports: Hypertension


Respiratory History: Reports: Asthma


Genitourinary History: Reports: None


OB/GYN History: Reports: Pregnancy


Other OB/GYN History: pre-ecclampsia


Musculoskeletal History: Reports: RA


Other Musculoskeletal History: degenerative disc, herniated disc, sciatic issues


Psychiatric History: Reports: Bipolar, Depression, PTSD, Suicidal Ideation





- Infectious Disease History


Infectious Disease History: Reports: Chicken Pox, Hepatitis A, Hepatitis B, 

Hepatitis C, HIV-Human Immunodeficiency Virus, Measles, Mumps, Shingles





- Past Surgical History


Female  Surgical History: Reports: None





Social & Family History





- Family History


Family Medical History: Noncontributory





- Caffeine Use


Caffeine Use: Reports: None





ED ROS GENERAL





- Review of Systems


Review Of Systems: Comprehensive ROS is negative, except as noted in HPI.


Constitutional: Denies: Fever, Chills


HEENT: Reports: No Symptoms


Respiratory: Denies: Shortness of Breath


Cardiovascular: Denies: Chest Pain


Endocrine: Reports: No Symptoms


GI/Abdominal: Denies: Abdominal Pain, Nausea, Vomiting


: Reports: No Symptoms


Musculoskeletal: Reports: Back Pain.  Denies: Neck Pain


Skin: Reports: No Symptoms


Neurological: Reports: Numbness (Prior L medial thigh numbness - unchanged.), 

Pre-Existing Deficit.  Denies: Tingling, Weakness, Gait Disturbance


Psychiatric: Reports: No Symptoms


Hematologic/Lymphatic: Reports: No Symptoms





ED EXAM,LOWER BACK PAIN/INJURY





- Physical Exam


Exam: See Below


Exam Limited By: No Limitations


General Appearance: Alert, No Apparent Distress


Nose: No: Nasal Drainage


Throat/Mouth: No Airway Compromise


Head: Atraumatic


Respiratory/Chest: No Respiratory Distress


Cardiovascular: Normal Peripheral Pulses, No Edema, Other (2+ DP and PT pulses 

bilaterally)


GI/Abdominal: Soft, Non-Tender


Back Exam: Other (Mild left-sided lumbar tenderness).  No: Vertebral Tenderness


Neurological: Alert, Normal Mood/Affect, Oriented x 3, Other (Upgoing EHLs 

bilaterally, sensation intact to light touch to the bilateral lower extremities 

although patient reports some subjective left medial thigh numbness which is at 

baseline and pre-existing.  5/5 strength all lower extremity muscle groups. 

Able to sit, stand, and ambulate after analgesia.).  No: Abnormal Light Touch, 

Saddle Anesthesia


DTR - Lower Extremities: 2+: Knee (R), Knee (L)


Skin Exam: Warm, Dry





Course





- Vital Signs


Text/Narrative:: 





34-year-old female presenting with acute on chronic left-sided low back pain 

with sciatica.  On arrival she was hemodynamically stable, well-appearing.  

Neurovascularly intact in the lower extremities.  She does have some pre-

existing subjective left medial thigh numbness that is been present for at 

least 3 months and is not any worse today.  No report of any new trauma or 

symptoms to necessitate advanced neuroimaging such as repeat CT or MRI.  

Patient was given IV fentanyl along with IV ketorolac, p.o. oxycodone, and a 

lidocaine patch with good relief of her pain.  Able to ambulate after these 

therapies and is able to care for herself.





Given her well appearance, reassuring physical examination, I believe the 

patient is stable for discharge home with outpatient clinic follow-up.  Short 

course of oxycodone was prescribed for breakthrough pain and instructions were 

given to take over-the-counter extra strength acetaminophen and ibuprofen as 

well.  Note [facility] was queried with no red flags noted.  Strict ED 

return precautions were provided.  Patient was discharged in good condition 

with all questions answered prior to departure.


Last Recorded V/S: 


 Last Vital Signs











Temp  36.4 C   05/18/20 18:51


 


Pulse  81   05/18/20 22:18


 


Resp  16   05/18/20 22:18


 


BP  137/72   05/18/20 22:18


 


Pulse Ox  96   05/18/20 22:18














- Orders/Labs/Meds


Orders: 


 Active Orders 24 hr











 Category Date Time Status


 


 Peripheral IV Care [RC] .AS DIRECTED Care  05/18/20 19:16 Active


 


 Sodium Chloride 0.9% [Normal Saline] Med  05/18/20 19:16 Active





 10 ml IV ASDIRECTED PRN   


 


 Sodium Chloride 0.9% [Saline Flush] Med  05/18/20 19:16 Active





 10 ml FLUSH ASDIRECTED PRN   


 


 Sodium Chloride 0.9% [Saline Flush] Med  05/18/20 19:16 Active





 2.5 ml FLUSH ASDIRECTED PRN   


 


 Peripheral IV Insertion Adult [OM.PC] Stat Oth  05/18/20 19:16 Ordered








 Medication Orders





Sodium Chloride (Saline Flush)  10 ml FLUSH ASDIRECTED PRN


   PRN Reason: Keep Vein Open


Sodium Chloride (Saline Flush)  2.5 ml FLUSH ASDIRECTED PRN


   PRN Reason: Keep Vein Open


Sodium Chloride (Normal Saline)  10 ml IV ASDIRECTED PRN


   PRN Reason: IV Use








Meds: 


Medications











Generic Name Dose Route Start Last Admin





  Trade Name Gabrielle  PRN Reason Stop Dose Admin


 


Sodium Chloride  10 ml  05/18/20 19:16  





  Saline Flush  FLUSH   





  ASDIRECTED PRN   





  Keep Vein Open   





     





     





     


 


Sodium Chloride  2.5 ml  05/18/20 19:16  





  Saline Flush  FLUSH   





  ASDIRECTED PRN   





  Keep Vein Open   





     





     





     


 


Sodium Chloride  10 ml  05/18/20 19:16  





  Normal Saline  IV   





  ASDIRECTED PRN   





  IV Use   





     





     





     














Discontinued Medications














Generic Name Dose Route Start Last Admin





  Trade Name Gabrielle  PRN Reason Stop Dose Admin


 


Fentanyl  100 mcg  05/18/20 19:16  05/18/20 19:26





  Fentanyl  IVPUSH  05/18/20 19:17  100 mcg





  ONETIME ONE   Administration





     





     





     





     


 


Fentanyl  50 mcg  05/18/20 21:02  05/18/20 21:07





  Fentanyl  IVPUSH  05/18/20 21:03  50 mcg





  ONETIME ONE   Administration





     





     





     





     


 


Ketorolac Tromethamine  15 mg  05/18/20 19:25  05/18/20 19:29





  Toradol  IVPUSH  05/18/20 19:26  15 mg





  ONETIME ONE   Administration





     





     





     





     


 


Lidocaine  700 mg  05/18/20 20:10  05/18/20 20:29





  Lidoderm 5%  TOP  05/18/20 20:11  700 mg





  ONETIME ONE   Administration





     





     





     





     


 


Oxycodone HCl  10 mg  05/18/20 20:09  05/18/20 20:28





  Oxycodone  PO  05/18/20 20:10  10 mg





  ONETIME ONE   Administration





     





     





     





     














- Re-Assessments/Exams


Free Text/Narrative Re-Assessment/Exam: 





05/18/20 20:11


Good pain relief with medications.  Moving left lower extremity more easily and 

appears more comfortable.  Ordered oxycodone and Lidoderm patch and will 

reassess.


Free Text/Narrative Re-Assessment/Exam: 





05/18/20 22:15


After second dose of fentanyl, the patient was more comfortable and was able to 

ambulate to the bathroom and back to the bed.  We will plan to discharge home.





Departure





- Departure


Time of Disposition: 22:18


Disposition: Home, Self-Care 01


Condition: Good


Clinical Impression: 


Sciatica


Qualifiers:


 Laterality: left Qualified Code(s): M54.32 - Sciatica, left side








- Discharge Information


*PRESCRIPTION DRUG MONITORING PROGRAM REVIEWED*: Yes


*COPY OF PRESCRIPTION DRUG MONITORING REPORT IN PATIENT MANDA: No


Prescriptions: 


Acetaminophen/oxyCODONE [Percocet 325-5 MG] 1 - 2 each PO Q6H PRN #15 tab


 PRN Reason: Pain (Moderate 4-6)


Instructions:  Sciatica, Easy-to-Read


Referrals: 


Iron Casas MD [Primary Care Provider] - 


Forms:  ED Department Discharge


Additional Instructions: 


The following information is given to patients seen in the emergency department 

who are being discharged to home. This information is to outline your options 

for follow-up care. We provide all patients seen in our emergency department 

with a follow-up referral.





The need for follow-up, as well as the timing and circumstances, are variable 

depending upon the specifics of your emergency department visit.


If you don't have a primary care physician on staff, we will provide you with a 

referral. We always advise you to contact your personal physician following an 

emergency department visit to inform them of the circumstance of the visit and 

for follow-up with them and/or the need for any referrals to a consulting 

specialist.





The emergency department will also refer you to a specialist when appropriate. 

This referral assures that you have the opportunity for follow-up care with a 

specialist. All of these measure are taken in an effort to provide you with 

optimal care, which includes your follow-up.





Under all circumstances we always encourage you to contact your private 

physician who remains a resource for coordinating your care. When calling for 

follow-up care, please make the office aware that this follow-up is from your 

recent emergency room visit. If for any reason you are refused follow-up, 

please contact the [facility] Emergency 

Department at [phone] and asked to speak to the emergency department 

charge nurse.


[facility]


Primary Care


[address] [place] [id number]


Phone: [phone]


Fax: [phone]


[address] [address]


Phone: [phone]


Fax: [phone]





Sepsis Event Note





- Evaluation


Sepsis Screening Result: No Definite Risk





- Focused Exam


Vital Signs: 


 Vital Signs











  Temp Pulse Resp BP Pulse Ox


 


 05/18/20 22:18   81  16  137/72  96


 


 05/18/20 21:16   79  16  157/82 H  95


 


 05/18/20 20:24   73  16  126/86  95


 


 05/18/20 18:51  36.4 C  87  22 H  138/100 H  94 L











Date Exam was Performed: 05/18/20


Time Exam was Performed: 22:22





- My Orders


Last 24 Hours: 


My Active Orders





05/18/20 19:16


Peripheral IV Care [RC] .AS DIRECTED 


Sodium Chloride 0.9% [Normal Saline]   10 ml IV ASDIRECTED PRN 


Sodium Chloride 0.9% [Saline Flush]   10 ml FLUSH ASDIRECTED PRN 


Sodium Chloride 0.9% [Saline Flush]   2.5 ml FLUSH ASDIRECTED PRN 


Peripheral IV Insertion Adult [OM.PC] Stat 














- Assessment/Plan


Last 24 Hours: 


My Active Orders





05/18/20 19:16


Peripheral IV Care [RC] .AS DIRECTED 


Sodium Chloride 0.9% [Normal Saline]   10 ml IV ASDIRECTED PRN 


Sodium Chloride 0.9% [Saline Flush]   10 ml FLUSH ASDIRECTED PRN 


Sodium Chloride 0.9% [Saline Flush]   2.5 ml FLUSH ASDIRECTED PRN 


Peripheral IV Insertion Adult [OM.PC] Stat

## 2020-05-21 ENCOUNTER — HOSPITAL ENCOUNTER (EMERGENCY)
Dept: HOSPITAL 56 - MW.ED | Age: 35
Discharge: HOME | End: 2020-05-21
Payer: MEDICAID
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
## 2020-05-21 DIAGNOSIS — Z79.899: ICD-10-CM

## 2020-05-21 DIAGNOSIS — Z88.2: ICD-10-CM

## 2020-05-21 DIAGNOSIS — I10: ICD-10-CM

## 2020-05-21 DIAGNOSIS — Z88.1: ICD-10-CM

## 2020-05-21 DIAGNOSIS — J45.909: ICD-10-CM

## 2020-05-21 DIAGNOSIS — S39.012A: Primary | ICD-10-CM

## 2020-05-21 PROCEDURE — 96372 THER/PROPH/DIAG INJ SC/IM: CPT

## 2020-05-21 PROCEDURE — 99283 EMERGENCY DEPT VISIT LOW MDM: CPT

## 2020-05-21 NOTE — EDM.PDOC
ED HPI GENERAL MEDICAL PROBLEM





- General


Chief Complaint: Back Pain or Injury


Stated Complaint: EXTREME BACK PAIN


Time Seen by Provider: 05/21/20 07:18





- History of Present Illness


INITIAL COMMENTS - FREE TEXT/NARRATIVE: 


34-year-old female with history of spinal stenosis, herniated disc, laminectomy

, DJD, bone spur, asthma presents with low back pain for 2 days.  She is a 

 for the [employer].  She admits to intermittent numbness and 

tingling down bilateral legs for 2 days. Pain is severe, intermittent, 

exacerbated by standing, walking, laying down. Yesterday she fell because her 

left leg gave out, she landed on her right knee. She currently denies right 

knee pain.  She denies any fever, chills, urinary incontinence, fecal 

incontinence, chest pain, shortness of breath, abdominal pain, recent back 

surgery, IVDA, immunocompromise state.  She had 2 episodes of vomiting 

yesterday secondary to pain.  She was seen in the ER 2 days ago for the same 

complaint and was prescribed Percocet 5/325 (15 tablets), she ran out this 

morning she because she was taking more than she was prescribed.  She had an 

MRI 2 months ago, and she is scheduled for back surgery at [place] on 6/17/2020.








ROS: A 10-point review of systems, other than pertinent positives and negatives 

as stated per HPI, is otherwise negative


_______________________________________________________________________________


PHYSICAL EXAM





General: AOx4, GCS = 15, moderate distress


HEENT: dry mucous membrane


Neck: supple, no meningismus, no Kernig or Brudzinski


Cardiac: S1S2 RRR


Respiratory: CTAB, no crackles or rales, no wheezing


Abdomen: Soft, nontender, no rebound or guarding, nondistended, no pulsatile 

mass.


Back: nontender to L spine midline, ttp to bilateral paralumbar muscles.


Musculoskeletal: NVI distally, no deformity


Neuro: No focal deficits, nml gait.


_______________________________________________________________________________


MEDICAL DECISION MAKING: I reviewed the patients past medical records, lab and 

radiographic findings. I discussed the case with family members. My 

differential diagnosis included: Lumbar strain, spinal stenosis, herniation.  

Her back pain is suggestive of musculoskeletal strain. There are no complaints 

of urinary or fecal incontinence, focal numbness or weakness. The patient has a 

normal gait in the ER. There is no evidence of fever, IV drug use, recent back 

surgery, or immunocompromised state. I do not suspect caude equine syndrome or 

cord compression or epidural abscess, which would warrant further imaging. 





  ** mid/lower back


Pain Score (Numeric/FACES): 10





- Related Data


 Allergies











Allergy/AdvReac Type Severity Reaction Status Date / Time


 


ciprofloxacin [From Cipro] Allergy  Hives Verified 05/21/20 07:27


 


Sulfa (Sulfonamide Allergy  Swelling Verified 05/21/20 07:27





Antibiotics)     











Home Meds: 


 Home Meds





Acetaminophen/oxyCODONE [Percocet 325-5 MG] 1 - 2 each PO Q6H PRN #15 tab 05/18/ 20 [Rx]


Acetaminophen/oxyCODONE [Percocet 325-5 MG] 1 each PO Q6HR PRN #10 tab 05/21/20 

[Rx]


Cyclobenzaprine [Flexeril] 10 mg PO TID #15 tab 05/21/20 [Rx]


Ibuprofen 800 mg PO Q6HR PRN #20 tablet 05/21/20 [Rx]


lisinopriL [Lisinopril] 40 mg PO DAILY 05/21/20 [History]











Past Medical History


HEENT History: Reports: None


Cardiovascular History: Reports: Hypertension


Respiratory History: Reports: Asthma


Genitourinary History: Reports: None


OB/GYN History: Reports: Pregnancy


Other OB/GYN History: pre-ecclampsia


Musculoskeletal History: Reports: RA


Other Musculoskeletal History: degenerative disc, herniated disc, sciatic issues


Psychiatric History: Reports: Bipolar, Depression, PTSD, Suicidal Ideation





- Infectious Disease History


Infectious Disease History: Reports: Chicken Pox, Hepatitis A, Hepatitis B, 

Hepatitis C, HIV-Human Immunodeficiency Virus, Measles, Mumps, Shingles





- Past Surgical History


Female  Surgical History: Reports: None





Social & Family History





- Family History


Family Medical History: Noncontributory





- Caffeine Use


Caffeine Use: Reports: None





ED ROS GENERAL





- Review of Systems


Review Of Systems: See Below (see dictation)





ED EXAM,LOWER BACK PAIN/INJURY





- Physical Exam


Exam: See Below (see dictation)





Course





- Vital Signs


Last Recorded V/S: 


 Last Vital Signs











Temp  96.1 F L  05/21/20 07:28


 


Pulse  89   05/21/20 07:28


 


Resp  19   05/21/20 07:28


 


BP  153/82 H  05/21/20 07:28


 


Pulse Ox  96   05/21/20 07:28














- Orders/Labs/Meds


Meds: 


Medications














Discontinued Medications














Generic Name Dose Route Start Last Admin





  Trade Name Freq  PRN Reason Stop Dose Admin


 


Ketorolac Tromethamine  60 mg  05/21/20 07:56  05/21/20 08:13





  Toradol  IM  05/21/20 07:57  60 mg





  ONETIME ONE   Administration





     





     





     





     














Departure





- Departure


Time of Disposition: 08:54


Disposition: Home, Self-Care 01


Condition: Good


Clinical Impression: 


 Low back strain








- Discharge Information


*PRESCRIPTION DRUG MONITORING PROGRAM REVIEWED*: Yes


*COPY OF PRESCRIPTION DRUG MONITORING REPORT IN PATIENT MANDA: Yes


Prescriptions: 


Acetaminophen/oxyCODONE [Percocet 325-5 MG] 1 each PO Q6HR PRN #10 tab


 PRN Reason: Pain (Moderate 4-6)


Ibuprofen 800 mg PO Q6HR PRN #20 tablet


 PRN Reason: Pain (Moderate 4-6)


Cyclobenzaprine [Flexeril] 10 mg PO TID #15 tab


Instructions:  Lumbar Sprain, Muscle Strain, Easy-to-Read


Referrals: 


Iron Casas MD [Primary Care Provider] - 


Forms:  ED Department Discharge


Additional Instructions: 


The following information is given to patients seen in the emergency department 

who are being discharged to home. This information is to outline your options 

for follow-up care. We provide all patients seen in our emergency department 

with a follow-up referral.





The need for follow-up, as well as the timing and circumstances, are variable 

depending upon the specifics of your emergency department visit.





If you don't have a primary care physician on staff, we will provide you with a 

referral. We always advise you to contact your personal physician following an 

emergency department visit to inform them of the circumstance of the visit and 

for follow-up with them and/or the need for any referrals to a consulting 

specialist.





The emergency department will also refer you to a specialist when appropriate. 

This referral assures that you have the opportunity for follow-up care with a 

specialist. All of these measure are taken in an effort to provide you with 

optimal care, which includes your follow-up.





Under all circumstances we always encourage you to contact your private 

physician who remains a resource for coordinating your care. When calling for 

follow-up care, please make the office aware that this follow-up is from your 

recent emergency room visit. If for any reason you are refused follow-up, 

please contact the [facility] Emergency 

Department at [phone] and asked to speak to the emergency department 

charge nurse.











Sepsis Event Note





- Focused Exam


Vital Signs: 


 Vital Signs











  Temp Pulse Resp BP Pulse Ox


 


 05/21/20 07:28  96.1 F L  89  19  153/82 H  96











Date Exam was Performed: 05/21/20


Time Exam was Performed: 08:53

## 2020-09-21 ENCOUNTER — HOSPITAL ENCOUNTER (EMERGENCY)
Dept: HOSPITAL 56 - MW.ED | Age: 35
Discharge: HOME | End: 2020-09-21
Payer: MEDICAID

## 2020-09-21 DIAGNOSIS — Z88.1: ICD-10-CM

## 2020-09-21 DIAGNOSIS — I10: ICD-10-CM

## 2020-09-21 DIAGNOSIS — Z88.2: ICD-10-CM

## 2020-09-21 DIAGNOSIS — Z79.899: ICD-10-CM

## 2020-09-21 DIAGNOSIS — Z20.828: ICD-10-CM

## 2020-09-21 DIAGNOSIS — J45.909: ICD-10-CM

## 2020-09-21 DIAGNOSIS — J06.9: Primary | ICD-10-CM

## 2020-09-21 PROCEDURE — 99283 EMERGENCY DEPT VISIT LOW MDM: CPT

## 2020-09-21 PROCEDURE — 71045 X-RAY EXAM CHEST 1 VIEW: CPT

## 2020-09-21 PROCEDURE — U0002 COVID-19 LAB TEST NON-CDC: HCPCS

## 2020-09-21 PROCEDURE — 87635 SARS-COV-2 COVID-19 AMP PRB: CPT

## 2020-09-21 NOTE — EDM.PDOC
ED HPI GENERAL MEDICAL PROBLEM





- General


Chief Complaint: General


Stated Complaint: MUSCLE SORENESS


Time Seen by Provider: 09/21/20 13:00


Source of Information: Reports: Patient


History Limitations: Reports: No Limitations





- History of Present Illness


INITIAL COMMENTS - FREE TEXT/NARRATIVE: 


HISTORY AND PHYSICAL:





History of present illness:


Patient is a 35-year-old female who presents to the emergency room with 

complaints of muscle aches, sore throat, runny nose, dry nonproductive cough, 

and nausea.  She states a coworker recently tested positive for COVID-19 and 

believes she was exposed.  Over the past few days she has been symptomatic and 

would like to get tested as she has kids at home.  Patient denies any change in 

vision, syncope or near syncope. Denies any chest pain, back pain, shortness of 

breath. Denies any abdominal pain, vomiting, diarrhea, constipation or dysuria. 

Has not noted any blood in urine or stool.  Denies any chance of pregnancy.  

Patient has been eating and drinking appropriately.





Review of systems: 


As per history of present illness and below otherwise all systems reviewed and 

negative.





Past medical history: 


As per history of present illness and as reviewed below otherwise 

noncontributory.





Surgical history: 


As per history of present illness and as reviewed below otherwise 

noncontributory.





Social history: 


See social history for further information





Family history: 


As per history of present illness and as reviewed below otherwise 

noncontributory.





Physical exam:


General: Well developed and well nourished. Alert and orientated x 3. Nontoxic 

in appearance and in no acute distress. Vital signs are stable and have been 

reviewed by me. Nursing notes were reviewed. 


HEENT: Atraumatic, normocephalic, pupils equal and reactive bilaterally, 

negative for conjunctival pallor or scleral icterus, mucous membranes moist, TMs

normal bilaterally, throat clear, neck supple, nontender, trachea midline. No 

drooling or trismus noted. No meningeal signs. No hot potato voice noted. 


Lungs: Clear to auscultation, breath sounds equal bilaterally, chest nontender. 

Normal work of breathing, no accessory muscles used.


Heart: S1S2, regular rate and rhythm without overt murmur


Abdomen: Soft, nondistended, nontender. Negative for masses or 

hepatosplenomegaly. Negative for costovertebral tenderness.


Skin: Intact, warm, dry. No lesions or rashes noted.


Hematologic: No petechiae or purpra. Mucosa appropriate color and normal nail 

bed color and refill.


Extremities: Atraumatic, moves all extremities per self without difficulty or 

deficits, negative for cords or calf pain. Neurovascular unremarkable.


Neuro: Awake, alert, oriented. Cranial nerves II through XII unremarkable. 

Cerebellum unremarkable. Motor and sensory unremarkable throughout. Exam 

nonfocal.


Psychiatric: Mood and affect are appropriate.  Normal thought process. Answering

questions appropriately.





Notes:


CXR is unremarkable. Negative COVID test here.  I have spoken with the 

patient/caregiver and discussed today's findings, in addition to providing 

specific details for plan of care.  Reassessment at the time of disposition 

demonstrates that the patient is in no acute distress.  The patient has remained

stable throughout the entire ED visit and is without objective evidence for 

acute process requiring urgent intervention or hospitalization.  The patient is 

stable for discharge, counseling was provided and we discussed in great detail 

signs and symptoms that would prompt them to return to the Emergency Department.

Medication, follow up and supportive care measures were reviewed and discussed. 

Voices understanding and is agreeable to plan of care. Denies any further 

questions or concerns at this time.





Diagnostics:


CXR, COVID





Therapeutics:


Zofran





Prescription:


Zofran





Impression: 


Viral URI





Plan:


1.  Your COVID-19 screening is negative.  If you are not in close contact to 

someone who is positive, you should continue to practice physical distancing and

limit your interactions with others as much as possible.  You may attend work 

and attend/perform essential activities if you are not sick.  If you continue to

feel unwell please stay home.  If you are in close contact with someone who 

tested positive, then you should continue to quarantine until you complete 14 

days.


2.  COVID-19 testing is not 100% accurate, if you continue to have symptoms you 

can follow-up at our respiratory clinic to be tested with a send out swab.


3. You can take NyQuil during the evening to help get a restful night sleep.


4.  You may alternate Tylenol and ibuprofen as needed for pain and fever 

management.


5. The [place] COVID 19 Hotline phone number [phone], They are open Monday - 

Friday 7am - 7pm. 


6. Follow up with your primary care provider for re-evaluation and if your 

symptoms should worsen, new symptoms develop or you feel like you are not 

improving you are always welcome to return to the emergency room. 


Definitive disposition and diagnosis as appropriate pending reevaluation and 

review of above.





  ** generalized muscle


Pain Score (Numeric/FACES): 8





- Related Data


                                    Allergies











Allergy/AdvReac Type Severity Reaction Status Date / Time


 


ciprofloxacin [From Cipro] Allergy  Hives Verified 09/21/20 13:07


 


Sulfa (Sulfonamide Allergy  Swelling Verified 09/21/20 13:07





Antibiotics)     











Home Meds: 


                                    Home Meds





Ibuprofen 800 mg PO Q6HR PRN #20 tablet 05/21/20 [Rx]


Losartan/Hydrochlorothiazide [Losartan-HCTZ 100-25 MG] 1 tab PO DAILY 09/21/20 

[History]











Past Medical History


HEENT History: Reports: None


Cardiovascular History: Reports: Hypertension


Respiratory History: Reports: Asthma


Gastrointestinal History: Reports: None


Genitourinary History: Reports: None


OB/GYN History: Reports: Pregnancy


Other OB/GYN History: pre-ecclampsia


Musculoskeletal History: Reports: RA


Other Musculoskeletal History: degenerative disc, herniated disc, sciatic issues


Neurological History: Reports: None


Psychiatric History: Reports: Bipolar, Depression, PTSD, Suicidal Ideation


Endocrine/Metabolic History: Reports: None


Hematologic History: Reports: None


Immunologic History: Reports: None


Oncologic (Cancer) History: Reports: None


Dermatologic History: Reports: None





- Infectious Disease History


Infectious Disease History: Reports: Chicken Pox, Hepatitis A, Hepatitis B, 

Hepatitis C, HIV-Human Immunodeficiency Virus, Measles, Mumps, Shingles





- Past Surgical History


Female  Surgical History: Reports: None





Social & Family History





- Family History


Family Medical History: Noncontributory





- Caffeine Use


Caffeine Use: Reports: None





ED ROS GENERAL





- Review of Systems


Review Of Systems: Comprehensive ROS is negative, except as noted in HPI.





ED EXAM, GENERAL





- Physical Exam


Exam: See Below (SEe dictation)





Course





- Vital Signs


Last Recorded V/S: 


                                Last Vital Signs











Temp  96.4 F L  09/21/20 13:04


 


Pulse  109 H  09/21/20 13:04


 


Resp  16   09/21/20 13:04


 


BP  129/93 H  09/21/20 13:04


 


Pulse Ox  96   09/21/20 13:04














- Orders/Labs/Meds


Orders: 


                               Active Orders 24 hr











 Category Date Time Status


 


 CORONAVIRUS COVID-19 PCR [facility] Stat Lab  09/21/20 13:48 Received











Labs: 


                                Laboratory Tests











  09/21/20 Range/Units





  13:35 


 


SARS CoV-2 RNA Rapid L[facility]ORE  NEGATIVE  (NEGATIVE)  











Meds: 


Medications














Discontinued Medications














Generic Name Dose Route Start Last Admin





  Trade Name Gabrielle  PRN Reason Stop Dose Admin


 


Ondansetron HCl  4 mg  09/21/20 13:13  09/21/20 13:38





  Zofran Odt  PO  09/21/20 13:14  4 mg





  ONETIME ONE   Administration














Departure





- Departure


Time of Disposition: 14:19


Disposition: Home, Self-Care 01


Clinical Impression: 


 Viral URI








- Discharge Information


Instructions:  Upper Respiratory Infection, Adult, Easy-to-Read


Referrals: 


Darrius Turcios MD [Primary Care Provider] - 


Forms:  ED Department Discharge


Additional Instructions: 


The following information is given to patients seen in the emergency department 

who are being discharged to home. This information is to outline your options 

for follow-up care. We provide all patients seen in our emergency department 

with a follow-up referral.





The need for follow-up, as well as the timing and circumstances, are variable 

depending upon the specifics of your emergency department visit.





If you don't have a primary care physician on staff, we will provide you with a 

referral. We always advise you to contact your personal physician following an 

emergency department visit to inform them of the circumstance of the visit and 

for follow-up with them and/or the need for any referrals to a consulting 

specialist.





The emergency department will also refer you to a specialist when appropriate. 

This referral assures that you have the opportunity for follow-up care with a 

specialist. All of these measure are taken in an effort to provide you with opti

mal care, which includes your follow-up.





Under all circumstances we always encourage you to contact your private 

physician who remains a resource for coordinating your care. When calling for 

follow-up care, please make the office aware that this follow-up is from your 

recent emergency room visit. If for any reason you are refused follow-up, please

contact the [facility] Emergency Department

at [phone] and asked to speak to the emergency department charge nurse.





[facility]


Primary Care


[address] [place] [id number]


Phone: [phone]


Fax: [phone]





[address] [place] [address]


Phone: [phone]


Fax: [phone]





Thank you for choosing the [facility] emergency department in 

[place] for your medical needs today.  It was a pleasure caring for you. Today

you were seen in the emergency department for respiratory symptoms.





1.  Your COVID-19 screening is negative.  If you are not in close contact to 

someone who is positive, you should continue to practice physical distancing and

limit your interactions with others as much as possible.  You may attend work 

and attend/perform essential activities if you are not sick.  If you continue to

feel unwell please stay home.  If you are in close contact with someone who 

tested positive, then you should continue to quarantine until you complete 14 

days.


2.  COVID-19 testing is not 100% accurate, if you continue to have symptoms you 

can follow-up at our respiratory clinic to be tested with a send out swab.


3. You can take NyQuil during the evening to help get a restful night sleep.


4.  You may alternate Tylenol and ibuprofen as needed for pain and fever prabhjot

gement.


5. The [place] COVID 19 Hotline phone number [phone], They are open Monday - 

Friday 7am - 7pm. 


6. Follow up with your primary care provider for re-evaluation and if your 

symptoms should worsen, new symptoms develop or you feel like you are not 

improving you are always welcome to return to the emergency room. 





Sepsis Event Note (ED)





- Evaluation


Sepsis Screening Result: No Definite Risk





- Focused Exam


Vital Signs: 


                                   Vital Signs











  Temp Pulse Resp BP Pulse Ox


 


 09/21/20 13:04  96.4 F L  109 H  16  129/93 H  96














- My Orders


Last 24 Hours: 


My Active Orders





09/21/20 13:48


CORONAVIRUS COVID-19 PCR [facility] Stat 














- Assessment/Plan


Last 24 Hours: 


My Active Orders





09/21/20 13:48


CORONAVIRUS COVID-19 PCR [facility] Stat

## 2020-09-21 NOTE — CR
Chest: Portable view of the chest was obtained.

 

Comparison: No prior chest imaging is available.

 

Heart size and mediastinum are within normal limits for portable 

technique.  Lungs are clear with no acute parenchymal change.  

Scoliosis is noted within the spine.

 

Impression:

1.  Scoliosis.

2.  Nothing acute is seen on portable chest x-ray.

 

Diagnostic code #2

 

This report was dictated in MDT

## 2020-10-01 ENCOUNTER — HOSPITAL ENCOUNTER (EMERGENCY)
Dept: HOSPITAL 56 - MW.ED | Age: 35
Discharge: HOME | End: 2020-10-01
Payer: COMMERCIAL

## 2020-10-01 DIAGNOSIS — I10: ICD-10-CM

## 2020-10-01 DIAGNOSIS — Z88.1: ICD-10-CM

## 2020-10-01 DIAGNOSIS — R07.81: ICD-10-CM

## 2020-10-01 DIAGNOSIS — Z88.2: ICD-10-CM

## 2020-10-01 DIAGNOSIS — Z79.899: ICD-10-CM

## 2020-10-01 DIAGNOSIS — M54.5: Primary | ICD-10-CM

## 2020-10-01 DIAGNOSIS — J45.909: ICD-10-CM

## 2020-10-01 PROCEDURE — 96372 THER/PROPH/DIAG INJ SC/IM: CPT

## 2020-10-01 PROCEDURE — 72131 CT LUMBAR SPINE W/O DYE: CPT

## 2020-10-01 PROCEDURE — 71101 X-RAY EXAM UNILAT RIBS/CHEST: CPT

## 2020-10-01 PROCEDURE — 99284 EMERGENCY DEPT VISIT MOD MDM: CPT
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
